# Patient Record
Sex: FEMALE | Race: ASIAN | NOT HISPANIC OR LATINO | Employment: UNEMPLOYED | URBAN - METROPOLITAN AREA
[De-identification: names, ages, dates, MRNs, and addresses within clinical notes are randomized per-mention and may not be internally consistent; named-entity substitution may affect disease eponyms.]

---

## 2023-12-07 ENCOUNTER — TRANSFERRED RECORDS (OUTPATIENT)
Dept: HEALTH INFORMATION MANAGEMENT | Facility: CLINIC | Age: 73
End: 2023-12-07

## 2024-07-24 ENCOUNTER — TELEPHONE (OUTPATIENT)
Dept: FAMILY MEDICINE | Facility: CLINIC | Age: 74
End: 2024-07-24

## 2024-07-24 NOTE — TELEPHONE ENCOUNTER
Called x2. LVM for pt. Re: Appt reminder with Dr. Da Silva tomorrow. This patient is new arrival patient and it is important that pt made it to the appt.

## 2024-07-25 ENCOUNTER — OFFICE VISIT (OUTPATIENT)
Dept: FAMILY MEDICINE | Facility: CLINIC | Age: 74
End: 2024-07-25

## 2024-07-25 ENCOUNTER — PATIENT OUTREACH (OUTPATIENT)
Dept: CARE COORDINATION | Facility: CLINIC | Age: 74
End: 2024-07-25

## 2024-07-25 VITALS
DIASTOLIC BLOOD PRESSURE: 77 MMHG | HEIGHT: 55 IN | RESPIRATION RATE: 16 BRPM | HEART RATE: 62 BPM | WEIGHT: 70.75 LBS | TEMPERATURE: 98.1 F | OXYGEN SATURATION: 97 % | SYSTOLIC BLOOD PRESSURE: 148 MMHG | BODY MASS INDEX: 16.37 KG/M2

## 2024-07-25 DIAGNOSIS — J98.01 BRONCHOSPASM: ICD-10-CM

## 2024-07-25 DIAGNOSIS — R93.89 ABNORMAL CXR: ICD-10-CM

## 2024-07-25 DIAGNOSIS — R63.4 UNEXPLAINED WEIGHT LOSS: Primary | ICD-10-CM

## 2024-07-25 DIAGNOSIS — K86.89 PANCREATIC DUCT CALCULUS: ICD-10-CM

## 2024-07-25 DIAGNOSIS — Z02.89 REFUGEE HEALTH EXAMINATION: ICD-10-CM

## 2024-07-25 LAB
ALBUMIN SERPL BCG-MCNC: 3.8 G/DL (ref 3.5–5.2)
ALP SERPL-CCNC: 99 U/L (ref 40–150)
ALT SERPL W P-5'-P-CCNC: 16 U/L (ref 0–50)
ANION GAP SERPL CALCULATED.3IONS-SCNC: 8 MMOL/L (ref 7–15)
AST SERPL W P-5'-P-CCNC: 28 U/L (ref 0–45)
BASOPHILS # BLD AUTO: 0 10E3/UL (ref 0–0.2)
BASOPHILS NFR BLD AUTO: 1 %
BILIRUB SERPL-MCNC: 0.3 MG/DL
BUN SERPL-MCNC: 18.6 MG/DL (ref 8–23)
CALCIUM SERPL-MCNC: 9.1 MG/DL (ref 8.8–10.4)
CHLORIDE SERPL-SCNC: 103 MMOL/L (ref 98–107)
CHOLEST SERPL-MCNC: 197 MG/DL
CREAT SERPL-MCNC: 0.68 MG/DL (ref 0.51–0.95)
EGFRCR SERPLBLD CKD-EPI 2021: 90 ML/MIN/1.73M2
EOSINOPHIL # BLD AUTO: 0.7 10E3/UL (ref 0–0.7)
EOSINOPHIL NFR BLD AUTO: 9 %
ERYTHROCYTE [DISTWIDTH] IN BLOOD BY AUTOMATED COUNT: 14.4 % (ref 10–15)
FASTING STATUS PATIENT QL REPORTED: ABNORMAL
FASTING STATUS PATIENT QL REPORTED: NORMAL
GLUCOSE SERPL-MCNC: 92 MG/DL (ref 70–99)
HAV AB SER QL IA: REACTIVE
HBV CORE AB SERPL QL IA: REACTIVE
HBV SURFACE AB SERPL IA-ACNC: 946 M[IU]/ML
HBV SURFACE AB SERPL IA-ACNC: REACTIVE M[IU]/ML
HBV SURFACE AG SERPL QL IA: NONREACTIVE
HCO3 SERPL-SCNC: 26 MMOL/L (ref 22–29)
HCT VFR BLD AUTO: 35.1 % (ref 35–47)
HCV AB SERPL QL IA: NONREACTIVE
HDLC SERPL-MCNC: 43 MG/DL
HGB BLD-MCNC: 11.3 G/DL (ref 11.7–15.7)
HIV 1+2 AB+HIV1 P24 AG SERPL QL IA: NONREACTIVE
IMM GRANULOCYTES # BLD: 0 10E3/UL
IMM GRANULOCYTES NFR BLD: 0 %
LDLC SERPL CALC-MCNC: 117 MG/DL
LYMPHOCYTES # BLD AUTO: 1.7 10E3/UL (ref 0.8–5.3)
LYMPHOCYTES NFR BLD AUTO: 24 %
MCH RBC QN AUTO: 28.2 PG (ref 26.5–33)
MCHC RBC AUTO-ENTMCNC: 32.2 G/DL (ref 31.5–36.5)
MCV RBC AUTO: 88 FL (ref 78–100)
MONOCYTES # BLD AUTO: 0.5 10E3/UL (ref 0–1.3)
MONOCYTES NFR BLD AUTO: 7 %
NEUTROPHILS # BLD AUTO: 4.2 10E3/UL (ref 1.6–8.3)
NEUTROPHILS NFR BLD AUTO: 58 %
NONHDLC SERPL-MCNC: 154 MG/DL
PLATELET # BLD AUTO: 228 10E3/UL (ref 150–450)
POTASSIUM SERPL-SCNC: 3.8 MMOL/L (ref 3.4–5.3)
PROT SERPL-MCNC: 7.6 G/DL (ref 6.4–8.3)
RBC # BLD AUTO: 4.01 10E6/UL (ref 3.8–5.2)
SODIUM SERPL-SCNC: 137 MMOL/L (ref 135–145)
T PALLIDUM AB SER QL: NONREACTIVE
TRIGL SERPL-MCNC: 184 MG/DL
VZV IGG SER QL IA: 2608 INDEX
VZV IGG SER QL IA: POSITIVE
WBC # BLD AUTO: 7.2 10E3/UL (ref 4–11)

## 2024-07-25 PROCEDURE — 80061 LIPID PANEL: CPT | Performed by: FAMILY MEDICINE

## 2024-07-25 PROCEDURE — 36415 COLL VENOUS BLD VENIPUNCTURE: CPT | Performed by: FAMILY MEDICINE

## 2024-07-25 PROCEDURE — 80053 COMPREHEN METABOLIC PANEL: CPT | Performed by: FAMILY MEDICINE

## 2024-07-25 PROCEDURE — 86682 HELMINTH ANTIBODY: CPT | Mod: 90 | Performed by: FAMILY MEDICINE

## 2024-07-25 PROCEDURE — 99000 SPECIMEN HANDLING OFFICE-LAB: CPT | Performed by: FAMILY MEDICINE

## 2024-07-25 PROCEDURE — 86708 HEPATITIS A ANTIBODY: CPT | Performed by: FAMILY MEDICINE

## 2024-07-25 PROCEDURE — 86481 TB AG RESPONSE T-CELL SUSP: CPT | Performed by: FAMILY MEDICINE

## 2024-07-25 PROCEDURE — 87340 HEPATITIS B SURFACE AG IA: CPT | Performed by: FAMILY MEDICINE

## 2024-07-25 PROCEDURE — 86706 HEP B SURFACE ANTIBODY: CPT | Performed by: FAMILY MEDICINE

## 2024-07-25 PROCEDURE — 86704 HEP B CORE ANTIBODY TOTAL: CPT | Performed by: FAMILY MEDICINE

## 2024-07-25 PROCEDURE — 99204 OFFICE O/P NEW MOD 45 MIN: CPT | Performed by: FAMILY MEDICINE

## 2024-07-25 PROCEDURE — 86780 TREPONEMA PALLIDUM: CPT | Performed by: FAMILY MEDICINE

## 2024-07-25 PROCEDURE — 86787 VARICELLA-ZOSTER ANTIBODY: CPT | Performed by: FAMILY MEDICINE

## 2024-07-25 PROCEDURE — 87491 CHLMYD TRACH DNA AMP PROBE: CPT | Performed by: FAMILY MEDICINE

## 2024-07-25 PROCEDURE — 85025 COMPLETE CBC W/AUTO DIFF WBC: CPT | Performed by: FAMILY MEDICINE

## 2024-07-25 PROCEDURE — 86803 HEPATITIS C AB TEST: CPT | Performed by: FAMILY MEDICINE

## 2024-07-25 PROCEDURE — 87389 HIV-1 AG W/HIV-1&-2 AB AG IA: CPT | Performed by: FAMILY MEDICINE

## 2024-07-25 NOTE — PROGRESS NOTES
Clinic Care Coordination Contact  Community Health Worker Initial Outreach    CHW Initial Information Gathering:  Referral Source: PCP  Preferred Hospital: [facility]  [phone]  Preferred Urgent Care: [facility] [place] [facility], [phone]  Current living arrangement:: I live in a private home with family  Type of residence:: Private home - stairs  Community Resources: None  Supplies Currently Used at Home: None  Equipment Currently Used at Home: none  Informal Support system:: Family  No PCP office visit in Past Year: No  Transportation means:: Family, Medical transport  CHW Additional Questions  If ED/Hospital discharge, follow-up appointment scheduled as recommended?: N/A  Medication changes made following ED/Hospital discharge?: N/A  MyChart active?: No  Patient agreeable to assistance with activating MyChart?: No    Patient accepts CC: Yes. Patient scheduled for assessment with CCC RN on 7/30/2024 at 2:00 PM. Patient noted desire to discuss medical complex and new refugee.     Patient has  through [facility]: [place]: [phone] and patient is receiving helps to apply for [employer] benefits, health insurance and discussed to apply for SSI as well.

## 2024-07-25 NOTE — PROGRESS NOTES
"ASSESMENT AND PLAN:  Diagnoses and all orders for this visit:  Unexplained weight loss  Concerning finding of unexplained weight loss along with possible mass abnormality on [place].  Reviewed differential diagnosis with the patient and her family with the help of a professional .  Patient wishes her granddaughter in law who speaks English fairly well and has a cell phone and is more established here in [place] to be the  regarding care coordination and test results etc.  Her phone number is listed as the primary contact phone number for this patient.  -     CT Chest/Abdomen/Pelvis w Contrast; Future    I am also going to get her connected with a care coordination referral because I am concerned about the patient needing a significant medical workup and will need assistance with making sure her MA is secured and that the patient and family can navigate to all of the required testing and follow-up.  Order entered.    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  I entered in a referral to gastroenterology, patient is being contacted with results, will also let her care coordination team know about this update so that ongoing care coordination can be completed.      Abnormal CXR done overseas  The overseas chest x-ray showed \"prominent bilateral hilar abnormality possible mass or adenopathy\"  -     CT Chest/Abdomen/Pelvis w Contrast; Future  Bronchospasm  Continue the as needed use of albuterol, she currently has a good supply she brought with her from [facility].  Refugee health examination  Will get her routine refugee health screening labs done today which will also help with diagnostic workup of her weight loss.  -     Quantiferon-TB Gold Plus; Future  -     Hepatitis B surface antigen; Future  -     Hepatitis B Surface Antibody; Future  -     Hepatitis B core antibody; Future  -     Hepatitis C Screen Reflex to HCV RNA Quant " and Genotype; Future  -     Comprehensive metabolic panel; Future  -     Treponema Abs w Reflex to RPR and Titer; Future  -     Varicella Zoster Virus Antibody IgG; Future  -     Strongyloides antibody IgG; Future  -     Schistosoma Antibody IgG; Future  -     Chlamydia trachomatis PCR; Future  -     Lipid panel reflex to direct LDL Fasting; Future  -     CBC with Platelets & Differential; Future  -     HIV Antigen Antibody Combo; Future  -     Hepatitis A Antibody Total; Future      Reviewed the risks and benefits of the treatment plan with the patient and/or caregiver and we discussed indications for routine and emergent follow-up.        SUBJECTIVE: 74-year-old female is a brand-new arrival refugee from the refugee [place] in [facility] and she comes in today for a 6 mediated health evaluation coordinated by the refugee resettlement team at [facility].  This is because her overseas documentation indicates that she has had a unexplained and concerning weight loss along with an abnormal chest x-ray concerning for possible mass.  Patient has not been having chronic cough or recurring fevers or hemoptysis.  No problems with abdominal pain or vomiting or diarrhea.  Her daughter and her granddaughter in law accompany her today, her granddaughter in law has been living in [place] previously and has fairly good English skills.  The patient has a history of bronchospasm and has albuterol prescribed from the physician that saw her in the refugee camp and she has been using that 2-3 times per week.  It gives her good results.  She also has been taking folic acid and other vitamin tablets from [place].    No past medical history on file.  There is no problem list on file for this patient.    No current outpatient medications on file.     History   Smoking Status    Former    Types: Cigarettes   Smokeless Tobacco    Never       OBJECTICE: BP (!) 148/77   Pulse 62   Temp 98.1  F (36.7  C) (Oral)   Resp 16   Ht 1.372 m (4'  "6\")   Wt 32.1 kg (70 lb 12 oz)   SpO2 97%   BMI 17.06 kg/m       Recent Results (from the past 24 hour(s))   CBC with platelets and differential    Collection Time: 07/25/24 10:16 AM   Result Value Ref Range    WBC Count 7.2 4.0 - 11.0 10e3/uL    RBC Count 4.01 3.80 - 5.20 10e6/uL    Hemoglobin 11.3 (L) 11.7 - 15.7 g/dL    Hematocrit 35.1 35.0 - 47.0 %    MCV 88 78 - 100 fL    MCH 28.2 26.5 - 33.0 pg    MCHC 32.2 31.5 - 36.5 g/dL    RDW 14.4 10.0 - 15.0 %    Platelet Count 228 150 - 450 10e3/uL    % Neutrophils 58 %    % Lymphocytes 24 %    % Monocytes 7 %    % Eosinophils 9 %    % Basophils 1 %    % Immature Granulocytes 0 %    Absolute Neutrophils 4.2 1.6 - 8.3 10e3/uL    Absolute Lymphocytes 1.7 0.8 - 5.3 10e3/uL    Absolute Monocytes 0.5 0.0 - 1.3 10e3/uL    Absolute Eosinophils 0.7 0.0 - 0.7 10e3/uL    Absolute Basophils 0.0 0.0 - 0.2 10e3/uL    Absolute Immature Granulocytes 0.0 <=0.4 10e3/uL        GEN-alert, very hard of hearing, in no acute distress   HEENT-mucous membranes are moist, neck is supple without any palpable mass or lymphadenopathy   CV-regular rate and rhythm with no murmur   RESP-somewhat distant breath sounds, no wheezing or crackles, no respiratory distress, O2 sats 97% on room air   ABDOMINAL-soft, nontender, no palpable mass   EXTREM-no pitting edema of the ankles     Signed Electronically by: Dickson Da Silva MD    "

## 2024-07-26 LAB
C TRACH DNA SPEC QL NAA+PROBE: NEGATIVE
GAMMA INTERFERON BACKGROUND BLD IA-ACNC: 0.07 IU/ML
M TB IFN-G BLD-IMP: NEGATIVE
M TB IFN-G CD4+ BCKGRND COR BLD-ACNC: 2.63 IU/ML
MITOGEN IGNF BCKGRD COR BLD-ACNC: -0.01 IU/ML
MITOGEN IGNF BCKGRD COR BLD-ACNC: 0 IU/ML
QUANTIFERON MITOGEN: 2.7 IU/ML
QUANTIFERON NIL TUBE: 0.07 IU/ML
QUANTIFERON TB1 TUBE: 0.06 IU/ML
QUANTIFERON TB2 TUBE: 0.07

## 2024-07-27 LAB
SCHISTOSOMA IGG SER IA-ACNC: 10 U
STRONGYLOIDES IGG SER IA-ACNC: 0.3 IV

## 2024-07-30 ENCOUNTER — PATIENT OUTREACH (OUTPATIENT)
Dept: NURSING | Facility: CLINIC | Age: 74
End: 2024-07-30

## 2024-08-02 ENCOUNTER — HOSPITAL ENCOUNTER (OUTPATIENT)
Dept: CT IMAGING | Facility: HOSPITAL | Age: 74
Discharge: HOME | End: 2024-08-02
Attending: FAMILY MEDICINE | Admitting: FAMILY MEDICINE

## 2024-08-02 DIAGNOSIS — R93.89 ABNORMAL CXR: ICD-10-CM

## 2024-08-02 DIAGNOSIS — R63.4 UNEXPLAINED WEIGHT LOSS: ICD-10-CM

## 2024-08-02 PROCEDURE — 250N000009 HC RX 250: Performed by: FAMILY MEDICINE

## 2024-08-02 PROCEDURE — 250N000011 HC RX IP 250 OP 636: Performed by: FAMILY MEDICINE

## 2024-08-02 PROCEDURE — 71260 CT THORAX DX C+: CPT

## 2024-08-02 RX ORDER — IOPAMIDOL 755 MG/ML
35 INJECTION, SOLUTION INTRAVASCULAR ONCE
Status: COMPLETED | OUTPATIENT
Start: 2024-08-02 | End: 2024-08-02

## 2024-08-02 RX ADMIN — IOPAMIDOL 35 ML: 755 INJECTION, SOLUTION INTRAVENOUS at 14:55

## 2024-08-02 RX ADMIN — SODIUM CHLORIDE 90 ML: 9 INJECTION, SOLUTION INTRAVENOUS at 14:56

## 2024-08-06 ENCOUNTER — PATIENT OUTREACH (OUTPATIENT)
Dept: NURSING | Facility: CLINIC | Age: 74
End: 2024-08-06

## 2024-08-06 ASSESSMENT — LIFESTYLE VARIABLES
AUDIT-C TOTAL SCORE: 0
SKIP TO QUESTIONS 9-10: 1

## 2024-08-06 ASSESSMENT — ACTIVITIES OF DAILY LIVING (ADL)
DEPENDENT_IADLS:: CLEANING;COOKING;LAUNDRY;SHOPPING;MEAL PREPARATION;MEDICATION MANAGEMENT;MONEY MANAGEMENT;TRANSPORTATION;INCONTINENCE

## 2024-08-06 NOTE — PROGRESS NOTES
Clinic Care Coordination Contact  Clinic Care Coordination Contact  OUTREACH    Referral Information:  Referral Source: PCP    Primary Diagnosis: GI Disorders    Chief Complaint   Patient presents with    Clinic Care Coordination - Follow-up     Clinic Utilization  Difficulty keeping appointments:: No  Compliance Concerns: No  No-Show Concerns: No  No PCP office visit in Past Year: No  Utilization      No Show Count (past year)  0             ED Visits  0             Hospital Admissions  0                    Current as of: 8/6/2024  3:42 PM            Clinical Concerns:  CCRN completed initial assessment with patient and grand daughter in law Logan Pineda via phone today. Logan Pineda - grand daughter in law. No C2C communicate on file. Patient gave verbal permission to speak to Logan Carreonchanell today. CCRN emailed Logan Pineda a copy of C2C to fill out and sign by patient. Logan will either email the form back or drop it off [facility]. Patient was referred to care coordination by Dr Da Silva to assist patient with coordinate cares, community resources, and navigate complex health care system due to [place]. Patient came to [facility] refugee [place] to [place] on 7/18/2024 with daughter and 4 grand kids. CCRN relayed Dr Perez's message below and patient agreed to schedule appointment with GI.     Prefers appt before 12pm or after 3:30pm     Dickson Da Silva MD  [facility] -please inform the patient that her CT scan shows a pancreatic duct stone and some associated changes in her pancreas that likely explain her weight loss.  The good news is that there is no signs of any type of cancer on her CT scan.  She will need to see gastroenterology for consultation for pancreatic duct stone-order entered, please help coordinate.  Thank you.    Gastroenterology - pancreatic duct stone and unexplained weight loss  CCRN called GI clinic today to assist patient schedule appointment but was  told clinic RN still need to review the referral and it can take up to 12 days. CCRN plans to follow up on this next week. Educated patient and grand daughter on what is pancreatic duct stone and the important of follow up appointment with GI provider. Patient verbalized understanding. New goal created.     Establish care with a primary provider  CCRN assisted patient schedule to establish care with Dr Farris on 10/21/2024 at 8:10 am.     Refugee screening   Patient, daughter and 4 grand children are scheduled for refugee screening which is required as new refugee with Dr Hooks on 8/21/2024 at 11:40am.     Dental exam  Will assist with patient dental appointment once insurance is active.     [facility] location  [address] [place] [address]  Phone: [phone]  Fax: [phone]    Eye exam  Will assist patient with eye appointment once insurance is active.     [facility]   [address] [place] [address]    [phone]    [place] ID   Grand daughter will take patient to apply for [place] ID    Social security card   Per grand daughter, patient already received SSN card.     [facility] service   Patient and grand daughter agreed to [facility] service with [facility] resources, apply for SSI and PCA. Gave verbal permission to refer patient to [place] once insurance active.     Pain  Pain (GOAL):: No  Health Maintenance Reviewed: Due/Overdue   Clinical Pathway: None    Medication Management:  Medication review status: Medications reviewed and no changes reported per patient.           Functional Status:  Dependent ADLs:: Bathing, Dressing, Grooming  Dependent IADLs:: Cleaning, Cooking, Laundry, Shopping, Meal Preparation, Medication Management, Money Management, Transportation, Incontinence  Bed or wheelchair confined:: No  Mobility Status: Independent  Fallen 2 or more times in the past year?: No  Any fall with injury in the past year?: No    Living Situation:  Current living  arrangement:: I live in a private home with family  Type of residence:: Private home - stairs    Lifestyle & Psychosocial Needs:    Social Determinants of Health     Food Insecurity: Low Risk  (8/6/2024)    Food Insecurity     Within the past 12 months, did you worry that your food would run out before you got money to buy more?: No     Within the past 12 months, did the food you bought just not last and you didn t have money to get more?: No   Depression: Not at risk (7/25/2024)    PHQ-2     PHQ-2 Score: 0   Housing Stability: Low Risk  (8/6/2024)    Housing Stability     Do you have housing? : Yes     Are you worried about losing your housing?: No   Tobacco Use: Medium Risk (7/25/2024)    Patient History     Smoking Tobacco Use: Former     Smokeless Tobacco Use: Never     Passive Exposure: Never   Financial Resource Strain: Low Risk  (8/6/2024)    Financial Resource Strain     Within the past 12 months, have you or your family members you live with been unable to get utilities (heat, electricity) when it was really needed?: No   Alcohol Use: Not At Risk (8/6/2024)    AUDIT-C     Frequency of Alcohol Consumption: Never     Average Number of Drinks: Patient does not drink     Frequency of Binge Drinking: Never   Transportation Needs: Low Risk  (8/6/2024)    Transportation Needs     Within the past 12 months, has lack of transportation kept you from medical appointments, getting your medicines, non-medical meetings or appointments, work, or from getting things that you need?: No   Physical Activity: Inactive (8/6/2024)    Exercise Vital Sign     Days of Exercise per Week: 0 days     Minutes of Exercise per Session: 0 min   Interpersonal Safety: Low Risk  (8/6/2024)    Interpersonal Safety     Do you feel physically and emotionally safe where you currently live?: Yes     Within the past 12 months, have you been hit, slapped, kicked or otherwise physically hurt by someone?: No     Within the past 12 months, have you  been humiliated or emotionally abused in other ways by your partner or ex-partner?: No   Stress: Stress Concern Present (8/6/2024)    [employer] - Occupational Stress Questionnaire     Feeling of Stress : To some extent   Social Connections: Unknown (8/6/2024)    Social Connection and Isolation Panel [NHANES]     Frequency of Communication with Friends and Family: More than three times a week     Frequency of Social Gatherings with Friends and Family: More than three times a week     Attends Mu-ism Services: More than 4 times per year     Active Member of Clubs or Organizations: Yes     Attends Club or Organization Meetings: Never     Marital Status: Not on file   Health Literacy: Inadequate Health Literacy (8/6/2024)     Health Literacy     Frequency of need for help with medical instructions: Always     Diet:: Regular  Inadequate nutrition (GOAL):: No  Tube Feeding: No  Inadequate activity/exercise (GOAL):: Yes  Significant changes in sleep pattern (GOAL): No  Transportation means:: Regular car, Family     Mu-ism or spiritual beliefs that impact treatment:: No  Mental health DX:: No  Mental health management concern (GOAL):: No  Chemical Dependency Status: No Current Concerns  Informal Support system:: Family, Children, [place] based      Resources and Interventions:  Current Resources:      Community Resources: None  Supplies Currently Used at Home: None  Equipment Currently Used at Home: none     Advance Care Plan/Directive  Advanced Care Plans/Directives on file:: No  Discussed with patient/caregiver:: Declined Further Information    Referrals Placed: None     Care Plan:  Care Plan: Gastroenterology       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       Start Date: 8/6/2024 Expected End Date: 12/31/2024    This Visit's Progress: 10%    Note:     Barriers:  language barrier, low literacy, noncompliance, and lack of knowledge how to navigate complex health care system  Strengths: motivated to attend appt  Patient expressed understanding of goal: Yes    Action steps to achieve this goal:  1. I will answer my phone when I am contacted to schedule my appointment.  2. I will attend my gastroenterology clinic appointment as scheduled  TBD  3. I will schedule a follow up appointment with my provider if it is recommended to do so while I am at the clinic.  4. I will follow up with CCC regarding this goal at each outreach until it is completed.                               Care Plan: Establish care with a PCP       Problem: establish care       Goal: Patient will establish care with a PCP in the next 5 months.       Start Date: 8/6/2024 Expected End Date: 12/31/2024    This Visit's Progress: 10%                            Outreach Frequency: 6 weeks, more frequently as needed  Future Appointments                In 2 weeks Megan Hooks MD [facility]    In 2 months Maia Walton MD [facility]            Plan: CCRN plans to follow up with GI clinic 1-2 weeks to follow up on appt status.

## 2024-08-09 ENCOUNTER — TELEPHONE (OUTPATIENT)
Dept: GASTROENTEROLOGY | Facility: CLINIC | Age: 74
End: 2024-08-09

## 2024-08-09 NOTE — TELEPHONE ENCOUNTER
Advanced [facility]oscopy     Referring provider: Dickson Da Silva MD [address] [place] [address] [address] [place] [address] Phone: [phone] Fax: [phone]    Referred to: [facility]     Provider Requested: NA     Referral Received: 8/5/24     Records received: EPIC     Images received: PACS    Insurance Coverage: Requested 8/9/24. 8/12/24: Patient currently uninsured. Will reconnect after the first of next month to reevaluate.     Evaluation for: R63.4 (ICD-10-CM) - Unexplained weight loss K86.89 (ICD-10-CM) - Pancreatic duct calculus     Clinical History (per RN review):   EXAM: CT CHEST/ABDOMEN/PELVIS W CONTRAST  LOCATION: [facility]  DATE: 8/2/2024     INDICATION:  Unexplained weight loss, Abnormal CXR    PANCREAS: 8 mm obstructing stone in the mid main pancreatic duct with marked ductal dilation and parenchymal atrophy and calcification upstream.                                                                  IMPRESSION:  1.  8 mm obstructing stone in the main pancreatic duct with significant atrophy of the pancreas upstream. Consider malabsorption as a consequence of pancreatic insufficiency as the etiology of weight loss.  2.  No evidence of cancer     Liver Function Studies -   Recent Labs   Lab Test 07/25/24  1016   PROTTOTAL 7.6   ALBUMIN 3.8   BILITOTAL 0.3   ALKPHOS 99   AST 28   ALT 16     MD review date: Dr. Rm 9/5/24  MD Decision for clinic consultation/Orders:         If pt is symptomatic, she  will be appropriate for the study as well. Please schedule ERCP with me.        Referral updates/Patient contacted: Awaiting insurance update.   8/12/24: Patient currently uninsured. Will reconnect after the first of next month to reevaluate.   9/3/24: Per Clinic coordinator team:  spoke with Granddaughter- and she stated that she will have to get the information from her  and will give the information to the billing office--9/3   10/10/24: Attempt to  "reach. [facility] with clinic contact information.   Patient's grandson returned call to clinic. Spoke with assistance from  services. He states that patient is \"Feeling fine\". No complaints of pain. No fevers. Eating and drinking well. Grandson believes she has now been maintaining her weight, though they have not weighed her recently. He states they have a  who is assisting with insurance coverage but it is not yet active. Discussed Dr. Rm's recommendation for outpatient care. Recommended they contact our clinic when insurance is active and we can pursue scheduling. Advised that if patient is experiencing pain, fevers or is unable to eat or drink, she should be brought to the [facility] Emergency department for care. Confirmed home address to send letter with clinic phone number and ED address.     Darcy Leonard RN Care Coordinator    "

## 2024-08-19 DIAGNOSIS — K86.89 PANCREATIC INSUFFICIENCY: ICD-10-CM

## 2024-08-19 DIAGNOSIS — K86.89 PANCREATIC DUCT CALCULUS: Primary | ICD-10-CM

## 2024-08-19 PROBLEM — D63.8 ANEMIA IN OTHER CHRONIC DISEASES CLASSIFIED ELSEWHERE: Status: ACTIVE | Noted: 2024-08-19

## 2024-08-19 PROBLEM — I51.7 CARDIOMEGALY: Status: ACTIVE | Noted: 2024-08-19

## 2024-08-19 PROBLEM — H25.013 CORTICAL AGE-RELATED CATARACT OF BOTH EYES: Status: ACTIVE | Noted: 2024-08-19

## 2024-08-19 PROBLEM — A15.0 PULMONARY TB: Status: ACTIVE | Noted: 2024-08-19

## 2024-08-19 PROBLEM — I10 ESSENTIAL HYPERTENSION: Status: ACTIVE | Noted: 2024-08-19

## 2024-08-19 PROBLEM — F17.200 SMOKER: Status: ACTIVE | Noted: 2024-08-19

## 2024-08-19 PROBLEM — H90.6 MIXED CONDUCTIVE AND SENSORINEURAL HEARING LOSS OF BOTH EARS: Status: ACTIVE | Noted: 2024-08-19

## 2024-08-21 ENCOUNTER — OFFICE VISIT (OUTPATIENT)
Dept: FAMILY MEDICINE | Facility: CLINIC | Age: 74
End: 2024-08-21

## 2024-08-21 VITALS
BODY MASS INDEX: 16.91 KG/M2 | HEART RATE: 80 BPM | WEIGHT: 73.08 LBS | SYSTOLIC BLOOD PRESSURE: 103 MMHG | HEIGHT: 55 IN | DIASTOLIC BLOOD PRESSURE: 66 MMHG | OXYGEN SATURATION: 97 % | TEMPERATURE: 98.5 F | RESPIRATION RATE: 18 BRPM

## 2024-08-21 DIAGNOSIS — K86.89 PANCREATIC DUCT CALCULUS: ICD-10-CM

## 2024-08-21 DIAGNOSIS — E55.9 VITAMIN D DEFICIENCY: ICD-10-CM

## 2024-08-21 DIAGNOSIS — K86.89 PANCREATIC INSUFFICIENCY: ICD-10-CM

## 2024-08-21 DIAGNOSIS — Z12.11 SCREEN FOR COLON CANCER: ICD-10-CM

## 2024-08-21 DIAGNOSIS — H25.013 CORTICAL AGE-RELATED CATARACT OF BOTH EYES: ICD-10-CM

## 2024-08-21 DIAGNOSIS — R63.6 UNDERWEIGHT: ICD-10-CM

## 2024-08-21 DIAGNOSIS — I51.7 CARDIOMEGALY: ICD-10-CM

## 2024-08-21 DIAGNOSIS — F17.200 SMOKER: ICD-10-CM

## 2024-08-21 DIAGNOSIS — H90.6 MIXED CONDUCTIVE AND SENSORINEURAL HEARING LOSS OF BOTH EARS: ICD-10-CM

## 2024-08-21 DIAGNOSIS — D63.8 ANEMIA IN OTHER CHRONIC DISEASES CLASSIFIED ELSEWHERE: ICD-10-CM

## 2024-08-21 DIAGNOSIS — Z78.9 MEDICALLY COMPLEX PATIENT: ICD-10-CM

## 2024-08-21 DIAGNOSIS — I10 ESSENTIAL HYPERTENSION: ICD-10-CM

## 2024-08-21 DIAGNOSIS — Z02.89 REFUGEE HEALTH EXAMINATION: Primary | ICD-10-CM

## 2024-08-21 PROCEDURE — 99397 PER PM REEVAL EST PAT 65+ YR: CPT | Mod: 25 | Performed by: FAMILY MEDICINE

## 2024-08-21 PROCEDURE — 84590 ASSAY OF VITAMIN A: CPT | Mod: 90 | Performed by: FAMILY MEDICINE

## 2024-08-21 PROCEDURE — 99213 OFFICE O/P EST LOW 20 MIN: CPT | Mod: 25 | Performed by: FAMILY MEDICINE

## 2024-08-21 PROCEDURE — 82150 ASSAY OF AMYLASE: CPT | Performed by: FAMILY MEDICINE

## 2024-08-21 PROCEDURE — 36415 COLL VENOUS BLD VENIPUNCTURE: CPT | Performed by: FAMILY MEDICINE

## 2024-08-21 PROCEDURE — 82607 VITAMIN B-12: CPT | Performed by: FAMILY MEDICINE

## 2024-08-21 PROCEDURE — 90471 IMMUNIZATION ADMIN: CPT | Performed by: FAMILY MEDICINE

## 2024-08-21 PROCEDURE — 84630 ASSAY OF ZINC: CPT | Mod: 90 | Performed by: FAMILY MEDICINE

## 2024-08-21 PROCEDURE — 82306 VITAMIN D 25 HYDROXY: CPT | Performed by: FAMILY MEDICINE

## 2024-08-21 PROCEDURE — 90715 TDAP VACCINE 7 YRS/> IM: CPT | Performed by: FAMILY MEDICINE

## 2024-08-21 PROCEDURE — 99000 SPECIMEN HANDLING OFFICE-LAB: CPT | Performed by: FAMILY MEDICINE

## 2024-08-21 PROCEDURE — 83690 ASSAY OF LIPASE: CPT | Performed by: FAMILY MEDICINE

## 2024-08-21 NOTE — PROGRESS NOTES
Assessment & Plan     Refugee health examination  Chart filled in with known history. This is a medically complex patient  - REVIEW OF HEALTH MAINTENANCE PROTOCOL ORDERS  - TDAP 10-64Y (ADACEL,BOOSTRIX)    Screen for colon cancer  declined    Pancreatic duct calculus  Found as the reason her appetite is so low. She has been referred for the next step in care.  - Vitamin B12  - Zinc  - Vitamin A  - Vitamin D Deficiency  - Amylase  - Lipase    Pancreatic insufficiency  Caused by the stone  - Vitamin B12  - Zinc  - Vitamin A  - Vitamin D Deficiency  - Amylase  - Lipase    Underweight  Result of not eating    Cardiomegaly  Noted on CXR    Cortical age-related cataract of both eyes  Severe. Hope to refer for removal    Anemia in other chronic diseases classified elsewhere  Noted, this should improve once she can eat more. I asked the family to boil her rice with meat    Essential hypertension  Following. BP not high today    Smoker  Noted, but she says she quit upon arrival to the [place].    Mixed conductive and sensorineural hearing loss of both ears  Hearing loss is profound - will refer for assessment    On the day of service, I spent 60 minutes with the patient, preparing for the visit with chart review, and charting.                  In person  Subjective   Marciano is a 74 year old, presenting for the following health issues:  refugee screening (Pt wants to talk about the imaging result )      8/21/2024    11:42 AM   Additional Questions   Roomed by vinnie mendoza   Accompanied by granddaughter-in-law and grandson          8/21/2024    11:42 AM   Patient Reported Additional Medications   Patient reports taking the following new medications inhaler spacer     HPI   Never hospitalized  No surgery    No to mammogram    Can barely eat. Understands there is a problem in the digestive system causing her not to be able to eat.        Objective    /66   Pulse 80   Temp 98.5  F (36.9  C) (Oral)   Resp 18   Ht 1.376 m (4'  "6.17\")   Wt 33.1 kg (73 lb 1.3 oz)   SpO2 97%   BMI 17.51 kg/m    Body mass index is 17.51 kg/m .  Physical Exam   GENERAL: alert, no distress, frail, elderly, and fatigued  EYES: cataracts visible in pupils, EOMI  NECK: no adenopathy, no asymmetry, masses, or scars  RESP: lungs clear to auscultation - no rales, rhonchi or wheezes  CV: regular rates and rhythm, normal S1 S2, and no peripheral edema  ABDOMEN: soft, nontender, bowel sounds normal, and mass none  MS: muscle wasting slight, generally, no edema  SKIN: no suspicious lesions or rashes and xerosis - generalized  NEURO: Normal strength and tone, sensory exam grossly normal, and abnormal mental status - she is alert but obviously does not hear well, even when she can hear (a family member speaks loudly and simply), she responds with something that does not always make sense  PSYCH: inattentive, affect flat, judgement and insight impaired, and appearance well groomed    Office Visit on 07/25/2024   Component Date Value Ref Range Status    Hepatitis B Surface Antigen 07/25/2024 Nonreactive  Nonreactive Final    Hepatitis B Surface Antibody 07/25/2024 Reactive   Final    A reactive result indicates recovery from acute or chronic hepatitis B virus (HBV) infection or acquired immunity from HBV vaccination. This assay does not differentiate between a vaccine-induced immune response and an immune response induced by infection with HBV. A positive total antihepatitis B core result would indicate that the hepatitis B surface antibody response is due to past HBV infection.    Hepatitis B Surface Antibody Instr* 07/25/2024 946.00  <8.5 m[IU]/mL Final    Hepatitis B Core Antibody Total 07/25/2024 Reactive (A)  Nonreactive Final    A reactive result indicates acute, chronic or past/resolved hepatitis B infection.    Hepatitis C Antibody 07/25/2024 Nonreactive  Nonreactive Final    A nonreactive screening test result does not exclude the possibility of exposure to or " infection with HCV. Nonreactive screening test results in individuals with prior exposure to HCV may be due to antibody levels below the limit of detection of this assay or lack of reactivity to the HCV antigens used in this assay. Patients with recent HCV infections (<3 months from time of exposure) may have false-negative HCV antibody results due to the time needed for seroconversion (average of 8 to 9 weeks).    Sodium 07/25/2024 137  135 - 145 mmol/L Final    Potassium 07/25/2024 3.8  3.4 - 5.3 mmol/L Final    Carbon Dioxide (CO2) 07/25/2024 26  22 - 29 mmol/L Final    Anion Gap 07/25/2024 8  7 - 15 mmol/L Final    Urea Nitrogen 07/25/2024 18.6  8.0 - 23.0 mg/dL Final    Creatinine 07/25/2024 0.68  0.51 - 0.95 mg/dL Final    GFR Estimate 07/25/2024 90  >60 mL/min/1.73m2 Final    eGFR calculated using 2021 CKD-EPI equation.    Calcium 07/25/2024 9.1  8.8 - 10.4 mg/dL Final    Reference intervals for this test were updated on 7/16/2024 to reflect our healthy population more accurately. There may be differences in the flagging of prior results with similar values performed with this method. Those prior results can be interpreted in the context of the updated reference intervals.    Chloride 07/25/2024 103  98 - 107 mmol/L Final    Glucose 07/25/2024 92  70 - 99 mg/dL Final    Alkaline Phosphatase 07/25/2024 99  40 - 150 U/L Final    AST 07/25/2024 28  0 - 45 U/L Final    ALT 07/25/2024 16  0 - 50 U/L Final    Protein Total 07/25/2024 7.6  6.4 - 8.3 g/dL Final    Albumin 07/25/2024 3.8  3.5 - 5.2 g/dL Final    Bilirubin Total 07/25/2024 0.3  <=1.2 mg/dL Final    Patient Fasting > 8hrs? 07/25/2024 Unknown   Final    Treponema Antibody Total 07/25/2024 Nonreactive  Nonreactive Final    VZV Winsome IgG Instrument Value 07/25/2024 2,608.0  <135.0 Index Final    Varicella Zoster Antibody IgG 07/25/2024 Positive   Final    Suggests previous exposure or immunization and probable immunity.    Strongyloides Winsome IgG 07/25/2024  0.3  <=0.9 IV Final    Comment: INTERPRETIVE INFORMATION: Strongyloides Ab, IgG by HESHAM      0.9 IV or less....... Negative - No significant                          level of Strongyloides IgG                          antibody detected.       1.0 IV................Equivocal - The Strongyloides IgG                           antibody result is borderline and                           therefore inconclusive. Recommend                           retesting the patient in 2-4 weeks,                          if clinically indicated.      1.1 IV or greater ... Positive - IgG antibodies to                          Strongyloides detected, which                          may suggest current or past                          infection.    False-positive results may occur with prior exposure to   other helminth infections. Testing low-prevalence   populations may also result in false-positive results.  Performed By: [facility]  [address] [place] [address]  : Chriss Mojica MD, PhD  CLIA                            Number: [id number]    Schistosoma Antibody IgG 07/25/2024 10 (H)  <=8 U Final      Equivocal - Recommend repeat testing in 2-4 weeks with   fresh sample.  Performed By: [facility]  [address] [place] [address]  : Chriss Mojica MD, PhD  CLIA Number: [id number]    Chlamydia trachomatis 07/25/2024 Negative  Negative Final    A negative result by transcription mediated amplification does not preclude the presence of C. trachomatis infection because results are dependent on proper and adequate collection, absence of inhibitors and sufficient rRNA to be detected.    Cholesterol 07/25/2024 197  <200 mg/dL Final    Triglycerides 07/25/2024 184 (H)  <150 mg/dL Final    Direct Measure HDL 07/25/2024 43 (L)  >=50 mg/dL Final    LDL Cholesterol Calculated 07/25/2024 117 (H)  <=100 mg/dL Final    Non HDL Cholesterol 07/25/2024 154 (H)  <130 mg/dL  Final    Patient Fasting > 8hrs? 07/25/2024 Unknown   Final    HIV Antigen Antibody Combo 07/25/2024 Nonreactive  Nonreactive Final    Negative HIV-1 p24 antigen and HIV-1/2 antibody screening test results usually indicate the absence of HIV-1 and HIV-2 infection. However, such negative results do not rule-out acute HIV infection.  If acute HIV-1 or HIV-2 infection is suspected, detection of HIV-1 or HIV-2 RNA  is recommended.     Hepatitis A Antibody Total 07/25/2024 Reactive   Final    This assay detects the presence of anti-hepatitis A virus (anti-HAV) total (both IgG and IgM combined). If clinically indicated, specific testing for anti-HAV IgM (HAVM / Hepatitis A IgM Antibody, Serum) is necessary to confirm the presence of acute or recent hepatitis A. Please see interpretation guide below.    Quantiferon Nil Tube 07/25/2024 0.07  IU/mL Final    Quantiferon TB1 Tube 07/25/2024 0.06  IU/mL Final    Quantiferon TB2 Tube 07/25/2024 0.07   Final    Quantiferon Mitogen 07/25/2024 2.70  IU/mL Final    WBC Count 07/25/2024 7.2  4.0 - 11.0 10e3/uL Final    RBC Count 07/25/2024 4.01  3.80 - 5.20 10e6/uL Final    Hemoglobin 07/25/2024 11.3 (L)  11.7 - 15.7 g/dL Final    Hematocrit 07/25/2024 35.1  35.0 - 47.0 % Final    MCV 07/25/2024 88  78 - 100 fL Final    MCH 07/25/2024 28.2  26.5 - 33.0 pg Final    MCHC 07/25/2024 32.2  31.5 - 36.5 g/dL Final    RDW 07/25/2024 14.4  10.0 - 15.0 % Final    Platelet Count 07/25/2024 228  150 - 450 10e3/uL Final    % Neutrophils 07/25/2024 58  % Final    % Lymphocytes 07/25/2024 24  % Final    % Monocytes 07/25/2024 7  % Final    % Eosinophils 07/25/2024 9  % Final    % Basophils 07/25/2024 1  % Final    % Immature Granulocytes 07/25/2024 0  % Final    Absolute Neutrophils 07/25/2024 4.2  1.6 - 8.3 10e3/uL Final    Absolute Lymphocytes 07/25/2024 1.7  0.8 - 5.3 10e3/uL Final    Absolute Monocytes 07/25/2024 0.5  0.0 - 1.3 10e3/uL Final    Absolute Eosinophils 07/25/2024 0.7  0.0 - 0.7  10e3/uL Final    Absolute Basophils 07/25/2024 0.0  0.0 - 0.2 10e3/uL Final    Absolute Immature Granulocytes 07/25/2024 0.0  <=0.4 10e3/uL Final    Quantiferon-TB Gold Plus 07/25/2024 Negative  Negative Final    No interferon gamma response to M.tuberculosis antigens was detected. Infection with M.tuberculosis is unlikely, however a single negative result does not exclude infection. In patients at high risk for infection, a second test should be considered in accordance with the 2017 ATS/IDSA/CDC Clinical Pract  ice Guidelines for Diagnosis of Tuberculosis in Adults and Children     TB1 Ag minus Nil Value 07/25/2024 -0.01  IU/mL Final    TB2 Ag minus Nil Value 07/25/2024 0.00  IU/mL Final    Mitogen minus Nil Result 07/25/2024 2.63  IU/mL Final    Nil Result 07/25/2024 0.07  IU/mL Final     Results for orders placed or performed in visit on 08/21/24   Vitamin B12     Status: Abnormal   Result Value Ref Range    Vitamin B12 2,219 (H) 232 - 1,245 pg/mL   Zinc     Status: None   Result Value Ref Range    Zinc, Serum/Plasma 61.3 60.0 - 120.0 ug/dL   Vitamin D Deficiency     Status: Abnormal   Result Value Ref Range    Vitamin D, Total (25-Hydroxy) 16 (L) 20 - 50 ng/mL    Narrative    Season, race, dietary intake, and treatment affect the concentration of 25-hydroxy-Vitamin D. Values may decrease during winter months and increase during summer months.    Vitamin D determination is routinely performed by an immunoassay specific for 25 hydroxyvitamin D3.  If an individual is on vitamin D2(ergocalciferol) supplementation, please specify 25 OH vitamin D2 and D3 level determination by LCMSMS test VITD23.     Amylase     Status: Normal   Result Value Ref Range    Amylase 65 28 - 100 U/L   Lipase     Status: Normal   Result Value Ref Range    Lipase 33 13 - 60 U/L           Signed Electronically by: Megan Hooks MD

## 2024-08-22 LAB
AMYLASE SERPL-CCNC: 65 U/L (ref 28–100)
LIPASE SERPL-CCNC: 33 U/L (ref 13–60)
VIT B12 SERPL-MCNC: 2219 PG/ML (ref 232–1245)
VIT D+METAB SERPL-MCNC: 16 NG/ML (ref 20–50)

## 2024-08-23 ENCOUNTER — TELEPHONE (OUTPATIENT)
Dept: FAMILY MEDICINE | Facility: CLINIC | Age: 74
End: 2024-08-23

## 2024-08-23 PROBLEM — Z78.9 MEDICALLY COMPLEX PATIENT: Status: ACTIVE | Noted: 2024-08-23

## 2024-08-23 PROBLEM — E55.9 VITAMIN D DEFICIENCY: Status: ACTIVE | Noted: 2024-08-23

## 2024-08-23 LAB — ZINC SERPL-MCNC: 61.3 UG/DL

## 2024-08-23 RX ORDER — CHOLECALCIFEROL (VITAMIN D3) 50 MCG
1 TABLET ORAL DAILY
Qty: 90 TABLET | Refills: 4 | Status: SHIPPED | OUTPATIENT
Start: 2024-08-23

## 2024-08-23 NOTE — TELEPHONE ENCOUNTER
Forms/Letter Request    Type of form/letter: DMMISHA/Handicap Parking      Do we have the form/letter: Yes    Who is the form from? Standard form (if other please explain)    Where did/will the form come from? Patient or family brought in       When is form/letter needed by: ASAP     How would you like the form/letter returned: Mail  Is this the correct address?: No -[facility]   Veh[facility] Services Division   [address] [place] [id number]     Patient Notified form requests are processed in 5-7 business days:Yes

## 2024-08-24 LAB
ANNOTATION COMMENT IMP: NORMAL
RETINYL PALMITATE SERPL-MCNC: 0.04 MG/L
VIT A SERPL-MCNC: 0.71 MG/L

## 2024-08-26 ENCOUNTER — TELEPHONE (OUTPATIENT)
Dept: FAMILY MEDICINE | Facility: CLINIC | Age: 74
End: 2024-08-26

## 2024-08-26 NOTE — TELEPHONE ENCOUNTER
----- Message from Megan Hooks sent at 8/25/2024  2:19 PM CDT -----  Team - please call patient with results. Let her/her family know her only vitamin deficiency found was for vitamin D. Vitamin A and zinc and B12 were all fine/normal.

## 2024-08-26 NOTE — TELEPHONE ENCOUNTER
"Writer called patient with the help of a \"Lula\"  regarding clinician's message below. Clinician's message relayed to patient's grandchild, Logan Pineda (CTC on file).    Grandchild verbalizes understanding, agrees with plan and has no further questions.    SHANTE YoN, RN   [facility]    "

## 2024-09-03 ENCOUNTER — PATIENT OUTREACH (OUTPATIENT)
Dept: NURSING | Facility: CLINIC | Age: 74
End: 2024-09-03

## 2024-09-03 ENCOUNTER — TELEPHONE (OUTPATIENT)
Dept: GASTROENTEROLOGY | Facility: CLINIC | Age: 74
End: 2024-09-03

## 2024-09-03 NOTE — PROGRESS NOTES
Clinic Care Coordination Contact  Follow Up Progress Note      Assessment: Patient's health [place] still [place] ROBSON Magana. Unable to assist patient scheduled GI appointment. Plan to assist patient schedule GI appt, dental appt, eye appt, PCA assessment, SSI, armhs service once insurance is active.     GI consult  GUIDO spoke with Javan today to follow up on appointment status. Per Javan, it's almost a month now and the referral wasn't triage out yet. Javan will reach out to his supervisor to follow up on this.     Plan: CCRN/CHW plans to assist patient schedule appts once health insurance is active.

## 2024-10-07 ENCOUNTER — PATIENT OUTREACH (OUTPATIENT)
Dept: CARE COORDINATION | Facility: CLINIC | Age: 74
End: 2024-10-07

## 2024-10-07 NOTE — PROGRESS NOTES
Attempted to reach patient and left VM. Current preventative visit is overdue. left scheduling number for patient to call. [phone]

## 2024-10-21 ENCOUNTER — OFFICE VISIT (OUTPATIENT)
Dept: FAMILY MEDICINE | Facility: CLINIC | Age: 74
End: 2024-10-21

## 2024-10-21 VITALS
WEIGHT: 78 LBS | HEIGHT: 55 IN | RESPIRATION RATE: 20 BRPM | OXYGEN SATURATION: 96 % | SYSTOLIC BLOOD PRESSURE: 128 MMHG | DIASTOLIC BLOOD PRESSURE: 81 MMHG | HEART RATE: 64 BPM | TEMPERATURE: 98.5 F | BODY MASS INDEX: 18.05 KG/M2

## 2024-10-21 DIAGNOSIS — Z23 NEED FOR VACCINATION: ICD-10-CM

## 2024-10-21 DIAGNOSIS — K86.89 PANCREATIC DUCT CALCULUS: ICD-10-CM

## 2024-10-21 DIAGNOSIS — K86.89 PANCREATIC INSUFFICIENCY: Primary | ICD-10-CM

## 2024-10-21 DIAGNOSIS — Z59.71 INSURANCE COVERAGE PROBLEMS: ICD-10-CM

## 2024-10-21 DIAGNOSIS — E55.9 VITAMIN D DEFICIENCY: ICD-10-CM

## 2024-10-21 DIAGNOSIS — J45.20 MILD INTERMITTENT ASTHMA WITHOUT COMPLICATION: ICD-10-CM

## 2024-10-21 PROCEDURE — 99214 OFFICE O/P EST MOD 30 MIN: CPT | Mod: 25 | Performed by: FAMILY MEDICINE

## 2024-10-21 PROCEDURE — 90471 IMMUNIZATION ADMIN: CPT | Performed by: FAMILY MEDICINE

## 2024-10-21 PROCEDURE — 90662 IIV NO PRSV INCREASED AG IM: CPT | Performed by: FAMILY MEDICINE

## 2024-10-21 RX ORDER — ALBUTEROL SULFATE 90 UG/1
2 INHALANT RESPIRATORY (INHALATION) EVERY 4 HOURS PRN
Qty: 18 G | Refills: 1 | Status: SHIPPED | OUTPATIENT
Start: 2024-10-21

## 2024-10-21 NOTE — PROGRESS NOTES
Assessment & Plan     Pancreatic insufficiency  Pancreatic duct calculus  We discussed this diagnosis.  They were wondering if she would need surgery for this as they would prefer to avoid surgery given her age and medical comorbidities.  Unfortunately they have not been able to set up a GI appointment due to not having insurance yet.  Will message care coordination to look into insurance.  She has gained a little bit of weight since her last appointment    Insurance coverage problems  Messaging care coordination as above    Mild intermittent asthma without complication  She has used albuterol as needed with good effect for her respiratory symptoms, [place].  We will continue albuterol as needed for now.  Plan ultimately to get PFTs.  Her CT showed some mild diffuse bronchial wall thickening and a benign intrapulmonary lymph node and a band of scarring or atelectasis in the right lower lobe.  She has a history of smoking a pipe but does not smoke anymore.  - albuterol (PROAIR HFA/PROVENTIL HFA/VENTOLIN HFA) 108 (90 Base) MCG/ACT inhaler; Inhale 2 puffs into the lungs every 4 hours as needed for shortness of breath, wheezing or cough.    Need for vaccination  Declines COVID  - INFLUENZA HIGH DOSE, TRIVALENT, PF (FLUZONE)    Vitamin D deficiency  She was given vitamin D in the past, will plan to recheck this when she has insurance          I spent a total of 32 minutes on the day of the visit.   Time spent by me doing chart review, history and exam, documentation and further activities per the note    Follow-up as scheduled in December Subjective   Marciano is a 74 year old, presenting for the following health issues:  Establish Care (No active insurance so unable to  med. Get asthma symptoms during cold season per granddaughter.)      10/21/2024     8:30 AM   Additional Questions   Roomed by paw p   Accompanied by daughter and granddaughter in law         10/21/2024     8:30 AM  "   Services Provided    services provided? Yes   Language Lula   Type of interpretation provided Telephone       History of Present Illness       Reason for visit:  Follow up    She eats 2-3 servings of fruits and vegetables daily.She consumes 1 sweetened beverage(s) daily.She exercises with enough effort to increase her heart rate 9 or less minutes per day.  She exercises with enough effort to increase her heart rate 3 or less days per week.   She is taking medications regularly.     Asthma  - breathing is okay for most part but when it gets cold or she gets tired, it is hard for her to breathe  - since June 2024  - has never had this in the past  - no fevers  - coughs when very cold, brings up mucous, no blood  - CT chest from 8/2/2024 reviewed - mild diffuse bronchial wall thickening, band of scarring or atelectasis RLL  - only meds she has taken is the ones that she got [place] the [place]: B1-6-12, folic acid, and amoxicillin  - smoked a pipe in the past, no cigarettes  - brought inhaler from [facility] and has been using it but ran out - did have albuterol per first note with Dr. Da Silva    Pancreas - wondering about this                Review of Systems  Constitutional, HEENT, cardiovascular, pulmonary, gi and gu systems are negative, except as otherwise noted.      Objective    /81   Pulse 64   Temp 98.5  F (36.9  C) (Oral)   Resp 20   Ht 1.366 m (4' 5.78\")   Wt 35.4 kg (78 lb)   SpO2 96%   BMI 18.96 kg/m    Body mass index is 18.96 kg/m .  Wt Readings from Last 3 Encounters:   10/21/24 35.4 kg (78 lb)   08/21/24 33.1 kg (73 lb 1.3 oz)   07/25/24 32.1 kg (70 lb 12 oz)         Physical Exam   General: well-appearing, no acute distress  HEENT: normocephalic, atraumatic  Eyes: conjunctivae normal  Neck: normal ROM  Cardiovascular: regular rate and rhythm, normal S1 and S2, no murmurs/rubs/gallops  Pulmonary: no increased work of breathing, clear to auscultation " bilaterally, no wheezes/rales/rhonchi  Musculoskeletal: normal ROM, no deformity  Neurological: gross motor exam normal  Skin: no rashes or lesions              Signed Electronically by: Maia Walton MD

## 2024-10-21 NOTE — Clinical Note
Patient was seen today in clinic and family says that she still does not have active insurance. I was wondering if there was any update on this? She also needs an albuterol inhaler - I sent to [facility] pharmacy - is there a way to get her set up with a short term supply for free or reduced price? Thank you!

## 2024-10-22 ENCOUNTER — PATIENT OUTREACH (OUTPATIENT)
Dept: CARE COORDINATION | Facility: CLINIC | Age: 74
End: 2024-10-22

## 2024-10-22 DIAGNOSIS — Z71.89 OTHER SPECIFIED COUNSELING: Primary | Chronic | ICD-10-CM

## 2024-10-22 NOTE — PROGRESS NOTES
Clinic Care Coordination Contact      Direct care provided in primary language, no  needed.     Follow Up Progress Note      Assessment: CCRN received a message from PCP as below:  Maia Walton MD Dah, Nadia, RN; Chino Schultz CHW  Patient was seen today in clinic and family says that she still does not have active insurance. I was wondering if there was any update on this? She also needs an albuterol inhaler - I sent to [facility] pharmacy - is there a way to get her set up with a short term supply for free or reduced price? Thank you!    Clinic staff reached out to ZORA Bloom today. Received update from Deisy as below:    Thank you for reaching out. I don't have any immediate updates. Both myself and the RA have been calling the county and leaving voice messages to try to engage them in processing this application. I am due to meet with [facility] to review several cases on Friday of this week. I can offer updates to you and Ana on Friday.     Kindly,  ZORA Mcdaniels   Health   Refugee [facility]    [facility]    [employer] Phone: [phone]   Fax: [phone]     Prescription Albuterol inhaler - CCRN spoke with  pharmacy tech at [facility] today to inquire how much out of pocket to fill albuterol inhaler. Was told with coupon $58.42. Updated patient's daughter. They agreed to pay. Grandson- Howard Moo plans to pick it up later today. Instructed daughter to call CCRN directly if unable to  the inhaler. Also instructed grand child to ask a pharmacist to show how to use the inhaler or call CCRN if education needed. Verbalized understanding.     Care Plans  Care Plan: Gastroenterology       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       Start Date: 8/6/2024 Expected End Date:  12/31/2024    This Visit's Progress: 10%    Note:     Barriers: language barrier, low literacy, noncompliance, and lack of knowledge how to navigate complex health care system  Strengths: motivated to attend appt  Patient expressed understanding of goal: Yes    Action steps to achieve this goal:  1. I will answer my phone when I am contacted to schedule my appointment.  2. I will attend my gastroenterology clinic appointment as scheduled  TBD  3. I will schedule a follow up appointment with my provider if it is recommended to do so while I am at the clinic.  4. I will follow up with [facility] regarding this goal at each outreach until it is completed.                               Care Plan: Health Insurance       Problem: No active insurance       Goal: Patient would like to apply for health insurance in the next 90 days.       Start Date: 10/22/2024 Expected End Date: 1/31/2025    This Visit's Progress: 10%    Note:     Barriers: languate   Strengths: Accepting of support,    Patient expressed understanding of goal: Yes     Action steps to achieve this goal:   1.  I will answer my phone when I am contacted by the [facility] FRW to schedule an initial appointment.   2.  I will provide all necessary documentation and information to [place] a [place].   3.  I will call my FRW if I have questions or concerns regarding my county benefits application.                                 Outreach Frequency: 6 weeks, more frequently as needed    Plan: Grandchild will  albuterol inhaler at [facility] today.

## 2024-10-22 NOTE — LETTER
V2.0    [facility] Progress Note      Name:  Cindi Rodriguez /Age/Sex: 1967  (57 y.o. female)   MRN & CSN:  [id number] & [id number] Encounter Date/Time: 2024 10:37 AM EDT   Location:  6301/6301-01 PCP: Alessandra Anderson DO     Attending:Wilberto Potter MD       Hospital Day: 4    Assessment and Recommendations   Cindi Rodriguez is a 57 y.o. female with pmh of end-stage renal disease, type 2 diabetes, hypertension and asthma who presented to the emergency room with complaints of abdominal pain.      Patient had been seen at [facility] on  for generalized weakness.  Patient had not attended dialysis for the past 2 weeks.      Plan:   Acute metabolic encephalopathy-patient with multiple missed dialysis sessions.  Peritonitis ruled out blood cultures pending.  Urine drug screen demonstrated cocaine.   ESRD- recently transitioned from PD to HD  Abdominal pain- CT imaging without acute process.  Await cell count from PD fluid  Type 2 diabetes- Medium dose sliding scale with hypoglycemia    Patient more alert today. Surgery consulted for removal of PD cath.   Diet ADULT DIET; Regular; Low Fat/Low Chol/High Fiber/2 gm Na   DVT Prophylaxis [] Lovenox, []  Heparin, [] SCDs, [] Ambulation,  [] Eliquis, [] Xarelto  [] Coumadin   Code Status Full Code   Disposition From: home  Expected Disposition: TBD   Estimated Date of Discharge: 2-3 days  Patient requires continued admission due to acute encephalopathy   Surrogate Decision Maker/ POA  Per EMR     Personally reviewed Lab Studies and Imaging   2024  Point-of-care glucose reviewed.  Phosphorus, CBC, BMP ordered and pending as patient refused lab draw earlier this morning.      On 2024 drugs that require monitoring for toxicity include insulin and the method of monitoring was POCT glucose to evaluate for hypoglycemia.    Discussed care of the patient with neurology.  No evidence of dialysis disequilibrium syndrome currently.  Monitor for improvement  [facility]  Patient Centered Plan of Care  About Me:        Patient Name:  Marciano Story    YOB: 1950  Age:         74 year old   Kenyatta MRN:    [id number] Telephone Information:  Home Phone [phone]   Mobile [phone]   Mobile [phone]       Address:  [address] [place] [address] Email address:  No e-mail address on record      Emergency Contact(s)    Name Relationship Lgl Grd Work Phone Home Phone Mobile Phone   1. DANIEL SELBY Grandchild  [phone]     2. JOSE JUAN SELBY Grandchild  [phone]             Primary language:  Lula     needed? Yes   [employer] [employer]:  [phone] op. 1  Other communication barriers:Language barrier; Physical impairment    Preferred Method of Communication:     Current living arrangement: I live in a private home with family    Mobility Status/ Medical Equipment: Independent        Health Maintenance  Health Maintenance Reviewed: Due/Overdue       My Access Plan  Medical Emergency 911   Primary Clinic Line [place] [phone]   24 Hour Appointment Line [phone] or  [phone]) (toll-free)   24 Hour Nurse Line [phone] (toll-free)   Preferred Urgent Care [facility] [phone]     [facility]  [phone]     Preferred Pharmacy [facility] - [place], [place] - [address]     Behavioral Health Crisis Line The National Suicide Prevention Lifeline at 1-365.985.4903 or Text/Call 328           My Care Team Members  Patient Care Team         Relationship Specialty Notifications Start End    Maia Walton MD PCP - General Family Medicine  10/21/24     Phone: [phone] Fax: [phone]         [address] , [address] [facility] [address]    Chino Schultz CHW Community Health Worker Primary Care - CC Admissions 7/25/24     Phone: [phone] Fax: [phone]         [address] [place], [place] [address]an [facility] [address]  [facility] [address]    Ana Blood, RN Lead Care Coordinator Primary Care - CC Admissions 7/25/24     Phone: [phone]         Megan Hooks MD Assigned PCP   9/23/24     Phone: [phone] Fax: [phone]         [address] [place] [address] [place] [address]                My Care Plans  Self Management and Treatment Plan    Care Plan  Care Plan: Gastroenterology       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       Start Date: 8/6/2024 Expected End Date: 12/31/2024    This Visit's Progress: 10%    Note:     Barriers: language barrier, low literacy, noncompliance, and lack of knowledge how to navigate complex health care system  Strengths: motivated to attend appt  Patient expressed understanding of goal: Yes    Action steps to achieve this goal:  1. I will answer my phone when I am contacted to schedule my appointment.  2. I will attend my gastroenterology clinic appointment as scheduled  TBD  3. I will schedule a follow up appointment with my provider if it is recommended to do so while I am at the clinic.  4. I will follow up with [facility] regarding this goal at each outreach until it is completed.                               Care Plan: Health Insurance       Problem: No active insurance       Goal: Patient would like to apply for health insurance in the next 90 days.       Start Date: 10/22/2024 Expected End Date: 1/31/2025    This Visit's Progress: 10%    Note:     Barriers: languate   Strengths: Accepting of support,    Patient expressed understanding of goal: Yes     Action steps to achieve this goal:   1.  I will answer my phone when I am contacted by the [facility] FRW to schedule an initial appointment.   2.  I will provide all necessary documentation and information to complete a [place] application.   3.  I will call my FRW if I have questions or concerns regarding my county benefits application.                                  Action Plans on File:                       Advance Care Plans/Directives:   Advanced Care Plan/Directives on file: No    Discussed with patient/caregiver(s): Declined Further Information             My Medical and Care Information  Problem List   Patient Active Problem List   Diagnosis    Cardiomegaly    Cortical age-related cataract of both eyes    Anemia in other chronic diseases classified elsewhere    Essential hypertension    Pulmonary TB    Smoker    Mixed conductive and sensorineural hearing loss of both ears    Pancreatic insufficiency    Medically complex patient    Vitamin D deficiency      Current Medications:  Please refer to the most recent medication list provided to you by your medical team and reach out to your provider with any questions or to make any corrections.    Care Coordination Start Date: 7/25/2024   Frequency of Care Coordination: monthly, more frequently as needed     Form Last Updated: 11/08/2024

## 2024-10-23 ENCOUNTER — PATIENT OUTREACH (OUTPATIENT)
Dept: CARE COORDINATION | Facility: CLINIC | Age: 74
End: 2024-10-23

## 2024-11-04 NOTE — TELEPHONE ENCOUNTER
Completed DMV form mailed to [place] DM[employer] as requested by provider.    Recommend starting a statin which patient declines.  ASCVD risk score is 12.7%.

## 2024-11-05 ENCOUNTER — PATIENT OUTREACH (OUTPATIENT)
Dept: CARE COORDINATION | Facility: CLINIC | Age: 74
End: 2024-11-05

## 2024-11-05 NOTE — PROGRESS NOTES
Clinic Care Coordination Contact  [facility]/Voicemail    Clinical Data: Care Coordinator Outreach    Outreach Documentation Number of Outreach Attempt   11/5/2024  10:45 AM 1     Left message on patient's voicemail with call back information and requested return call.    Plan: Care Coordinator will try to reach patient again in two weeks.    [place], called refugee resettlement agency  and was told patient was discharged after 90 days and was also informed that health insurance is not active.

## 2024-11-20 ENCOUNTER — PATIENT OUTREACH (OUTPATIENT)
Dept: CARE COORDINATION | Facility: CLINIC | Age: 74
End: 2024-11-20
Payer: MEDICAID

## 2024-11-20 DIAGNOSIS — Z71.89 OTHER SPECIFIED COUNSELING: Primary | Chronic | ICD-10-CM

## 2024-11-20 NOTE — PROGRESS NOTES
Clinic Care Coordination Contact  Community Health Worker Follow Up    Care Gaps:   Health Maintenance Due   Topic Date Due    DEXA  Never done    ASTHMA ACTION PLAN  Never done    ASTHMA CONTROL TEST  Never done    ADVANCE CARE PLANNING  Never done    Pneumococcal Vaccine: 65+ Years (1 of 2 - PCV) Never done    COLORECTAL CANCER SCREENING  Never done    ZOSTER IMMUNIZATION (1 of 2) Never done    MEDICARE ANNUAL WELLNESS VISIT  Never done    RSV VACCINE (1 - 1-dose 75+ series) Never done    COVID-19 Vaccine (1 - 2024-25 season) Never done     Care Gaps Last addressed on 10/21/2024.    Care Plan:   Care Plan: Gastroenterology       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       Start Date: 8/6/2024 Expected End Date: 12/31/2024    This Visit's Progress: 10% Recent Progress: 10%    Note:     Barriers: language barrier, low literacy, noncompliance, and lack of knowledge how to navigate complex health care system  Strengths: motivated to attend appt  Patient expressed understanding of goal: Yes    Action steps to achieve this goal:  1. I will answer my phone when I am contacted to schedule my appointment.  2. I will attend my gastroenterology clinic appointment as scheduled  TBD  3. I will schedule a follow up appointment with my provider if it is recommended to do so while I am at the clinic.  4. I will follow up with CCC regarding this goal at each outreach until it is completed.     Note: CHW called GI scheduling line and was told this patient was recommended to do ERCP procedure. CHW left a voice message to call back for coordination.                             Care Plan: Establish care with a PCP Completed 10/22/2024      Problem: establish care  Resolved 10/22/2024      Goal: Patient will establish care with a PCP in the next 5 months.  Completed 10/22/2024      Start Date: 8/6/2024 Expected End Date:  12/31/2024    This Visit's Progress: 100% Recent Progress: 10%    Note:     Barriers: language barrier, low literacy, noncompliance, and lack of knowledge how to navigate complex health care system  Strengths: motivated to attend appt  Patient expressed understanding of goal: Yes    Action steps to achieve this goal:  1. I will answer my phone when I am contacted to schedule my appointment.  2. I will attend my appointment with Dr Farris to establish care as scheduled on 10/21/2024. Completed.  3. I will schedule a follow up appointment with my PCP if it is recommended to do so while I am at the clinic.  4. I will follow up with [facility] regarding this goal at each outreach until it is completed.                               Care Plan: Health Insurance       Problem: No active insurance       Goal: Patient would like to apply for health insurance in the next 90 days.  Completed 11/20/2024      Start Date: 10/22/2024 Expected End Date: 1/31/2025    This Visit's Progress: 100% Recent Progress: 10%    Note:     Barriers: languate   Strengths: Accepting of support,    Patient expressed understanding of goal: Yes     Action steps to achieve this goal:   1.  I will answer my phone when I am contacted by the [facility] FRW to schedule an initial appointment.   2.  I will provide all necessary documentation and information to complete a [place] application.   3.  I will call my FRW if I have questions or concerns regarding my county benefits application.     Patient's health [place] is active with [place].                          Intervention and Education during outreach:   -Patient's health insurance is active with [facility] [employer] and eligibility begins date on 8/01/2024.  -CHW called [facility] billing department and updated insurance to resolve outstanding bill balance. Billing was able to back dated for August 2024 visit dates but unable to back date for July 2024 due patient's health insurance eligibility begins date on  8/01/2024.   -CHW re-referred to FRW inquiring to change eligibility begins date as of July 2024 so the billing can back dated.   -CHW attempted to refer patient for PCA assessment but unable to and per [place] that it will be best to call managed care line after December 1st to see if patient is enrolled in [place] [facility] due to patient is 65 and older.   -CHW attempted to call GI to schedule initial appointment but was transferred to ERCP scheduling line and had to leave a voice message to call back for coordination.     CHW Next Outreach: In one month.    [place] Department of Human Services: [facility] Care Programs Eligibility Response (335)    SUBSCRIBER INFORMATION   Date of Service Subscriber ID Subscriber Name Birthdate Age Gender   11/20/2024 [id number] [facility] 1950 74 FEMALE          Address   [address] , , [place]  [address]          PROVIDER INFORMATION   Provider ID Submitter Transaction ID Provider Name Taxonomy Code Qualifier Taxonomy Code   [id number] xx [facility] Programs   This subscriber has eligibility for [place]: Medical [place].  Elig Type EX: Over age 65  Eligibility Begin Date: 08/01/2024  Eligibility End Date: --/--/----  This subscriber is eligible for the following service types: Medical Care ,  Chiropractic ,  Dental Care ,  Hospital ,  Hospital - Inpatient ,  Hospital - Outpatient ,  Emergency Services ,  Pharmacy ,  Professional (Physician) Visit - Office ,  Vision (Optometry) ,  Mental Health ,  Urgent Care   Prepaid Health Plan   None       Other Eligibility Information   No Special Transportation.  This subscribers eligibility is not determined for Long term Care and waiver services.  No Hospice.  County of residence is unknown.  Refer to Health Care Programs and Services Overview of the [facility] Provider Manual for a list of covered services.   Waivers   None     Subscriber Responsibility Information   None     Restricted  Recipient Program   None       Medicare   None

## 2024-11-22 ENCOUNTER — PATIENT OUTREACH (OUTPATIENT)
Dept: GASTROENTEROLOGY | Facility: CLINIC | Age: 74
End: 2024-11-22
Payer: MEDICAID

## 2024-11-22 NOTE — PROGRESS NOTES
Received the following message: Kenyatta Hall calling to schedule an ERCP for this patient. The person who called said he can be reached at [phone].     Returned call to the number provided. RADHA with my name and contact information; requested return call to clinic.     Patient was referred to our service in August 2024. Recommendations per Dr. Rm to proceed with ERCP for pancreatic duct stone. Care was postponed until medical insurance was active. Patient now has an active insurance plan on file.     Darcy Leonard RN Care Coordinator

## 2024-12-03 ENCOUNTER — PATIENT OUTREACH (OUTPATIENT)
Dept: CARE COORDINATION | Facility: CLINIC | Age: 74
End: 2024-12-03
Payer: MEDICAID

## 2024-12-03 NOTE — PROGRESS NOTES
Clinic Care Coordination Contact  Care Coordination Clinician Chart Review    Situation: Patient chart reviewed by Care Coordinator.       Background: Care Coordination Program started: 7/25/2024. Initial assessment completed and patient-centered care plan(s) were developed with participation from patient. Lead CC handed patient off to CHW for continued outreaches.       Assessment: Per chart review, patient outreach completed by CC CHW on 11/22/24.  Patient is actively working to accomplish goal(s). Patient's goal(s) appropriate and relevant at this time. Patient is not due for updated Plan of Care.  Assessments will be completed annually or as needed/with change of patient status.    GI clinic appointment - CCRN tried calling GI clinic today [phone] but on hold > 25 mins. Will ask CHW to call tomorrow to assist patient schedule appt.       Care Plan: Gastroenterology       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       Start Date: 8/6/2024 Expected End Date: 12/31/2024    This Visit's Progress: 10% Recent Progress: 10%    Note:     Barriers: language barrier, low literacy, noncompliance, and lack of knowledge how to navigate complex health care system  Strengths: motivated to attend appt  Patient expressed understanding of goal: Yes    Action steps to achieve this goal:  1. I will answer my phone when I am contacted to schedule my appointment.  2. I will attend my gastroenterology clinic appointment as scheduled  TBD  3. I will schedule a follow up appointment with my provider if it is recommended to do so while I am at the clinic.  4. I will follow up with CCC regarding this goal at each outreach until it is completed.     Note: CHW called GI scheduling line and was told this patient was recommended to do ERCP procedure. CHW left a voice message to call back for coordination.                              Care Plan: Health Insurance       Problem: No active insurance                        Plan/Recommendations: The patient will continue working with Care Coordination to achieve goal(s) as above. CHW will continue outreaches at minimum every 30 days and will involve Lead CC as needed or if patient is ready to move to Maintenance. Lead CC will continue to monitor CHW outreaches and patient's progress to goal(s) every 6 weeks.     Plan of Care updated and sent to patient: No

## 2024-12-04 ENCOUNTER — PATIENT OUTREACH (OUTPATIENT)
Dept: CARE COORDINATION | Facility: CLINIC | Age: 74
End: 2024-12-04
Payer: MEDICAID

## 2024-12-04 NOTE — PROGRESS NOTES
FRW Update   12/04/24 Outreach attempted x 2. Left message on voicemail indicating last outreach attempt.   Plan: FRW closed the FRW program, sent letter. Patient can be referred back to [facility] if needed.

## 2024-12-04 NOTE — PROGRESS NOTES
Clinic Care Coordination Contact  Community Health Worker Follow Up    Care Gaps:   Health Maintenance Due   Topic Date Due    DEXA  Never done    ASTHMA ACTION PLAN  Never done    ASTHMA CONTROL TEST  Never done    ADVANCE CARE PLANNING  Never done    Pneumococcal Vaccine: 65+ Years (1 of 2 - PCV) Never done    COLORECTAL CANCER SCREENING  Never done    ZOSTER IMMUNIZATION (1 of 2) Never done    MEDICARE ANNUAL WELLNESS VISIT  Never done    RSV VACCINE (1 - 1-dose 75+ series) Never done    COVID-19 Vaccine (1 - 2024-25 season) Never done     Scheduled on 12/23/2024 for preventive care visit.      Care Plan:   Care Plan: Gastroenterology       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       Start Date: 8/6/2024 Expected End Date: 12/31/2024    This Visit's Progress: 10% Recent Progress: 10%    Note:     Barriers: language barrier, low literacy, noncompliance, and lack of knowledge how to navigate complex health care system  Strengths: motivated to attend appt  Patient expressed understanding of goal: Yes    Action steps to achieve this goal:  1. I will answer my phone when I am contacted to schedule my appointment.  2. I will attend my gastroenterology clinic appointment as scheduled. Pending...  3. I will schedule a follow up appointment with my provider if it is recommended to do so while I am at the clinic.  4. I will follow up with CCC regarding this goal at each outreach until it is completed.     Note: CHW called GI procedure RN care coordinator at [phone] and left another voice message to call back to coordinate ERCP procedure. CHW also sent staff message to Darcy Leonard RN care coordinator requesting to call back.                             Care Plan: Health Insurance       Problem: No active insurance                 Intervention and Education during outreach:   -CHW reviewed outstanding  bill balance and it appear there's no outstanding balance on the account.   -CHW called GI RN care coordinator at [phone] and left a voice message to call back to coordinate ERCP procedure.   -Patient and family members were informed to call with questions or concerns.     CHW Next Outreach: In one month.

## 2024-12-04 NOTE — LETTER
[facility] CARE COORDINATION        December 4, 2024      [facility]  [address] [place] [address]        Dear [facility],                                                                      The Financial Resource team has attempted to reach to you to assist you with any financial barriers you may be facing in your healthcare journey.  We would like to provide any additional support you may need related to financial support for your healthcare.  Our team are Certified MNSure Navigators, and we offer support with application assistance for Medical Assistance, MNSure Applications, Energy Assistance, Emergency Assistance, Food Assistance and many other initial applications for [facility] benefits.     I would appreciate if you would call me at [phone] to let me know if you would like assistance.      Sincerely,                                                                         Anita Weinstein MA

## 2024-12-05 NOTE — PROGRESS NOTES
Spoke with Chino, Community Health Worker with [facility]. We attempted to get patient on the line at time of call; spoke with her daughter with Chino's assistance to interpret. They report that patient is elderly and is unable to come to the phone.     Patient's daughter declined to schedule procedure, states that patient is elderly [place].     Reviewed with Chino's assistance the rationale for referral and procedural recommendations. She states that her mother is doing well, has a good appetite and is sleeping well. States that she is no longer losing weight.     Discussed that we would close referral at this time. Advised they seek medical attention in the event of new onset abdominal pain or fevers.     Darcy Leonard, RN Care Coordinator

## 2024-12-05 NOTE — PROGRESS NOTES
Received messaging from Chino requesting to assist in the scheduling of ERCP for patient. Attempted to reach. LVM with clinic contact information.     Darcy Leonard RN Care Coordinator

## 2024-12-23 ENCOUNTER — PATIENT OUTREACH (OUTPATIENT)
Dept: NURSING | Facility: CLINIC | Age: 74
End: 2024-12-23
Payer: MEDICAID

## 2024-12-23 NOTE — PROGRESS NOTES
Clinic Care Coordination Contact  Follow Up Progress Note      Assessment: Patient no showed in person appointment with CCRN today because transportation didn't show up. CCRN switched in person visit to telephone visit. CCRN spoke with patient and daughter to follow up on EGD procedure patient declined to schedule. CCRN educated patient and daughter on the important of GI consult or EGD procedure but patient continues to decline to schedule. Patient states she is doing fine for now and will think about it when she starts to feel the symptoms. States weight is stable and she always this skinny. Instructed patient to follow up her PCP on 2/11/25.     Advised that if patient is experiencing pain, fevers or is unable to eat or drink, she should be brought to the ED.    Plan: Patient is graduated from CC today.

## 2025-01-29 ENCOUNTER — PATIENT OUTREACH (OUTPATIENT)
Dept: GERIATRIC MEDICINE | Facility: CLINIC | Age: 75
End: 2025-01-29
Payer: MEDICAID

## 2025-01-29 NOTE — PROGRESS NOTES
[facility] Care Coordination Contact    [facility] Initial Assessment     Home visit for Initial Health Risk Assessment with Marciano Story completed on January 29, 2025    Type of residence:: Private home - stairs  Current living arrangement:: I live in a private home with family     Assessment completed with:: Patient, Family, Children    Current Care Plan  Member currently receiving the following home care services:     Member currently receiving the following community resources: None    Medication Review  Medication reconciliation completed in Epic: Yes  Medication set-up & administration: Family/informal caregiver sets up daily.  Family caregiver administers medications.  Medication Risk Assessment Medication (1 or more, place referral to MTM): N/A: No risk factors identified  MTM Referral Placed: No: No risk factors idenified    Mental/Behavioral Health   Depression Screening:   PHQ-2 Total Score (Adult) - Positive if 3 or more points; Administer PHQ-9 if positive: 0       Mental health DX:: No        No current  services-will place referral for  Declined     Falls Assessment:   Fallen 2 or more times in the past year?: No   Any fall with injury in the past year?: No    ADL/IADL Dependencies:   Dependent ADLs:: Ambulation-no assistive device, Bathing, Dressing, Grooming, Incontinence, Toileting  Dependent IADLs:: Cleaning, Cooking, Laundry, Shopping, Meal Preparation, Medication Management, Money Management, Transportation, Incontinence    Health Plan sponsored benefits: [employer]: Shared information regarding preventative health screening and health plan supplemental benefits/incentives. Reviewed medication disposal form and transition of care member handout.    CFSS Assessment completed at visit: Yes Initial CFSS Assessment indicated 4hrs hours per day of CFSS. Member and family has no preference in consultation agency and referral was made to [facility] consultation.  Care coordinator  "explained initial cfss requires consultation services prior to starting and it may take a few months while this is in transition.  Family will continue to provide informal support daily. [facility] once CFSS is approved. RP for CFSS will be granddaughter-in-law Logan Pineda.    Elderly Waiver Eligibility: Yes-will open to EW.  Member feels isolated and would like to attend adult day care to meet new people.  Care coordinator submitted DHS [id number] and [id number] to [facility].  Care coordinator explained to RP that this process may take a few months as we wait for [facility] approval.      Care Plan & Recommendations: Marciano is quiet and pleasant [place] woman who recently immigrated from refugee [place] in [place] to [place] 7/2024.  She is alert to person and place and preferred questions be directed to family members.  Family report poor short-term memory recall.  \"She gets sad and cries and is emotional when she talks about her life in refugee [place].\"  No SI or threats to harm others.  CC discussed [facility] services which was declined.  Family feel Marciano needs more time to [place] to life in states.  Family will continue to help manage all her ADL/IADLs while we wait for [facility] services.  Falls prevention discussed.  Marciano has eye appointment and follow up with PCP in next few weeks.  Writer encouraged family help all medical appointments. Marciano would like following DME: pullups, tub bench and cane.  CC will help coordinate with PCP for RX and order with DME vendor.     See [facility] Assessment for detailed assessment information.    Follow-Up Plan: Member informed of future contact, plan to f/u with member with a 6 month telephone assessment.  Contact information shared with member and family, encouraged member to call with any questions or concerns at any time.    [place] care continuum providers: Please see Snapshot and Care Management Flowsheets for Specific details of care plan.    This CC note " routed to PCP, Maia Walton Mai, RN N  [facility] Coordinator  [phone]

## 2025-01-29 NOTE — Clinical Note
Home visit completed for initial health risk assessment for community services and supports.  Marciano is doing well living with family and continues to have daily support from them.  I have approved CFSS (formerly PCA program) but she will have to wait until she receives consultation services per [facility].  I will submit paperwork to [facility] for adult day care.  Marciano would like: pull-ups, tub bench and cane.  If appropriate, please sign separate RX and send it back to me and I will help place with [facility].   Thanks Carmen Champagne RN PHN [facility] Care Coordinator [phone]

## 2025-01-30 ENCOUNTER — PATIENT OUTREACH (OUTPATIENT)
Dept: GERIATRIC MEDICINE | Facility: CLINIC | Age: 75
End: 2025-01-30
Payer: MEDICAID

## 2025-01-30 NOTE — PROGRESS NOTES
[facility] Care Coordination Contact    Received after visit chart from care coordinator.  Completed following tasks: Mailed copy of support plan to member, Mailed [place] Choices signature sheet pages 3-4, Mailed Safe Medication Disposal , and Updated services in Database.Mail out HCD worksheet to member  .  [facility]:  Emailed required Consultation Service documents to [facility], NO CFSS/PCA agency has been chosen at this time.  Mailed member supplemental summary chart and assessment summary letter.     Kellen Cabrales  Care Management Specialist  [facility]  [phone]

## 2025-01-30 NOTE — LETTER
January 30, 2025       [facility]  [address] [place] [address]      Dear Marciano,    At [facility], we re dedicated to improving your health and wellness. Enclosed is the Support Plan developed with you on 01/29/2025. Please review the Support Plan carefully.    As a reminder, during your visit we talked about:   Ways to manage your physical and mental health   Using health care to maintain and improve your health    Your preventive care needs      Remember to contact your care coordinator if you:   Are hospitalized or plan to be hospitalized    Have a fall     Have a change in your physical or mental health   Need help finding support or services    If you have questions or don t agree with your Support Plan, call me at [phone]. You can also call me if your needs change. TTY users call the [place] Relay at 911 or 1-786.653.9582 (vvlgcv-mz-mojbwi relay service).    Sincerely,         Carmen Champagne RN, PHN  [phone]  [email or web address]                [id number] accepted     (06/2024)                [address] [place] [address]  [phone]  fax [phone]  [facility] [place]

## 2025-02-11 ENCOUNTER — TELEPHONE (OUTPATIENT)
Dept: FAMILY MEDICINE | Facility: CLINIC | Age: 75
End: 2025-02-11

## 2025-02-11 ENCOUNTER — OFFICE VISIT (OUTPATIENT)
Dept: FAMILY MEDICINE | Facility: CLINIC | Age: 75
End: 2025-02-11
Payer: COMMERCIAL

## 2025-02-11 VITALS
DIASTOLIC BLOOD PRESSURE: 80 MMHG | HEART RATE: 70 BPM | RESPIRATION RATE: 16 BRPM | WEIGHT: 84.25 LBS | HEIGHT: 55 IN | SYSTOLIC BLOOD PRESSURE: 144 MMHG | BODY MASS INDEX: 19.5 KG/M2 | TEMPERATURE: 98.2 F | OXYGEN SATURATION: 97 %

## 2025-02-11 DIAGNOSIS — I10 ESSENTIAL HYPERTENSION: ICD-10-CM

## 2025-02-11 DIAGNOSIS — Z23 NEED FOR VACCINATION: ICD-10-CM

## 2025-02-11 DIAGNOSIS — K86.89 PANCREATIC INSUFFICIENCY: ICD-10-CM

## 2025-02-11 DIAGNOSIS — I51.7 CARDIOMEGALY: ICD-10-CM

## 2025-02-11 DIAGNOSIS — Z02.89 REFUGEE HEALTH EXAMINATION: ICD-10-CM

## 2025-02-11 DIAGNOSIS — J45.20 MILD INTERMITTENT ASTHMA WITHOUT COMPLICATION: ICD-10-CM

## 2025-02-11 DIAGNOSIS — N64.4 BREAST PAIN, LEFT: ICD-10-CM

## 2025-02-11 DIAGNOSIS — D63.8 ANEMIA IN OTHER CHRONIC DISEASES CLASSIFIED ELSEWHERE: ICD-10-CM

## 2025-02-11 DIAGNOSIS — E55.9 VITAMIN D DEFICIENCY: ICD-10-CM

## 2025-02-11 DIAGNOSIS — H61.23 BILATERAL IMPACTED CERUMEN: ICD-10-CM

## 2025-02-11 DIAGNOSIS — E78.2 MIXED HYPERLIPIDEMIA: ICD-10-CM

## 2025-02-11 DIAGNOSIS — Z00.00 ROUTINE GENERAL MEDICAL EXAMINATION AT A HEALTH CARE FACILITY: Primary | ICD-10-CM

## 2025-02-11 DIAGNOSIS — H90.6 MIXED CONDUCTIVE AND SENSORINEURAL HEARING LOSS OF BOTH EARS: ICD-10-CM

## 2025-02-11 DIAGNOSIS — N63.24 MASS OF LOWER INNER QUADRANT OF LEFT BREAST: ICD-10-CM

## 2025-02-11 DIAGNOSIS — H25.013 CORTICAL AGE-RELATED CATARACT OF BOTH EYES: ICD-10-CM

## 2025-02-11 PROBLEM — F17.200 SMOKER: Status: RESOLVED | Noted: 2024-08-19 | Resolved: 2025-02-11

## 2025-02-11 LAB
FOLATE SERPL-MCNC: 4.3 NG/ML (ref 4.6–34.8)
HGB BLD-MCNC: 12.9 G/DL (ref 11.7–15.7)

## 2025-02-11 PROCEDURE — T1013 SIGN LANG/ORAL INTERPRETER: HCPCS

## 2025-02-11 RX ORDER — ACETAMINOPHEN 325 MG/1
325-650 TABLET ORAL EVERY 6 HOURS PRN
Qty: 100 TABLET | Refills: 2 | Status: SHIPPED | OUTPATIENT
Start: 2025-02-11

## 2025-02-11 RX ORDER — IBUPROFEN 400 MG/1
400 TABLET, FILM COATED ORAL EVERY 6 HOURS PRN
Qty: 100 TABLET | Refills: 2 | Status: SHIPPED | OUTPATIENT
Start: 2025-02-11

## 2025-02-11 SDOH — HEALTH STABILITY: PHYSICAL HEALTH: ON AVERAGE, HOW MANY DAYS PER WEEK DO YOU ENGAGE IN MODERATE TO STRENUOUS EXERCISE (LIKE A BRISK WALK)?: 3 DAYS

## 2025-02-11 ASSESSMENT — ASTHMA QUESTIONNAIRES
ACT_TOTALSCORE: 24
QUESTION_3 LAST FOUR WEEKS HOW OFTEN DID YOUR ASTHMA SYMPTOMS (WHEEZING, COUGHING, SHORTNESS OF BREATH, CHEST TIGHTNESS OR PAIN) WAKE YOU UP AT NIGHT OR EARLIER THAN USUAL IN THE MORNING: NOT AT ALL
QUESTION_4 LAST FOUR WEEKS HOW OFTEN HAVE YOU USED YOUR RESCUE INHALER OR NEBULIZER MEDICATION (SUCH AS ALBUTEROL): NOT AT ALL
QUESTION_5 LAST FOUR WEEKS HOW WOULD YOU RATE YOUR ASTHMA CONTROL: WELL CONTROLLED
ACT_TOTALSCORE: 24
QUESTION_2 LAST FOUR WEEKS HOW OFTEN HAVE YOU HAD SHORTNESS OF BREATH: NOT AT ALL
QUESTION_1 LAST FOUR WEEKS HOW MUCH OF THE TIME DID YOUR ASTHMA KEEP YOU FROM GETTING AS MUCH DONE AT WORK, SCHOOL OR AT HOME: NONE OF THE TIME

## 2025-02-11 ASSESSMENT — SOCIAL DETERMINANTS OF HEALTH (SDOH): HOW OFTEN DO YOU GET TOGETHER WITH FRIENDS OR RELATIVES?: NEVER

## 2025-02-11 NOTE — TELEPHONE ENCOUNTER
Forms/Letter Request    Type of form/letter:       Is Release of Information needed?: Yes  Was an MADDY obtained?  Yes    Do we have the form/letter: Yes: Dr. Kaleigh beard    Who is the form from? [facility] (if other please explain)    Where did/will the form come from? Patient or family brought in       When is form/letter needed by: Earliest convenience    How would you like the form/letter returned: Fax : [phone]    Patient Notified form requests are processed in 5-7 business days:Yes    Okay to leave a detailed message?: Yes at Home number on file [phone] (home)

## 2025-02-11 NOTE — PROGRESS NOTES
Preventive Care Visit  [facility] MD, Family Medicine  Feb 11, 2025      Assessment & Plan     Routine general medical examination at a health care facility  Labs, screenings, and vaccines as ordered and counseling as detailed below.    Vitamin D deficiency  Check vit D - not taking right now. Has had a fall this year. Check DEXA.  - DEXA HIP/PELVIS/SPINE - Future; Future  - Vitamin D Deficiency; Future  - Vitamin D Deficiency    Mixed conductive and sensorineural hearing loss of both ears  - Adult Audiology  Referral; Future    Anemia in other chronic diseases classified elsewhere  Check hemoglobin and other labs as ordered  - Hemoglobin; Future  - Ferritin; Future  - Folate; Future  - Hemoglobin  - Ferritin  - Folate    Refugee health examination  Schisto was indeterminate - will repeat  - Schistosoma Antibody IgG; Future  - Schistosoma Antibody IgG    Breast pain, left  Check US an diagnostic mammo. Use tylenol and ibuprofen prn pain.  - acetaminophen (TYLENOL) 325 MG tablet; Take 1-2 tablets (325-650 mg) by mouth every 6 hours as needed for mild pain.  - ibuprofen (ADVIL/MOTRIN) 400 MG tablet; Take 1 tablet (400 mg) by mouth every 6 hours as needed for moderate pain.  - US Breast Bilateral Limited 1-3 Quadrants; Future  - MA Diagnostic Bilateral w/ Moshe; Future    Bilateral impacted cerumen  Offered irrigation versus debrox and she chose debrox  - carbamide peroxide (DEBROX) 6.5 % otic solution; Place 5 drops into both ears 2 times daily for 3 days.    Need for vaccination  Declines COVID and zoster.  - Pneumococcal 20 Valent Conjugate (PCV20)  - RSV VACCINE (ABRYSVO)    Mass of lower inner quadrant of left breast  See above  - US Breast Bilateral Limited 1-3 Quadrants; Future  - MA Diagnostic Bilateral w/ Moshe; Future    Cardiomegaly  Noted on CT - check echo.  - Echocardiogram Complete; Future    Essential hypertension  BP above goal < 140/90 today,  but previously had been normal. Come back in 2-4 w for RN BP check. If still elevated, would recommend treatment.    Cortical age-related cataract of both eyes  Declines referral for this today.    Mild intermittent asthma without complication  Symtpoms well controlled with prn albuterol.    Mixed hyperlipidemia  ASCVD score 22% - would recommend repeating fasting lipids and then discussing whether she would want to start a statin in future.    Pancreatic insufficiency  [place] losing weight on [place] to [place]. Had CT that showed pancreatic duct calculus causing atrophy of pancreas, likely leading to pancreatic insufficiency. Recommendation was to see GI and do ERCP. She has been gaining weight now, has a good appetite, and denies abdominal pain or diarrhea. She does not wish to see GI.        Patient has been advised of split billing requirements and indicates understanding: Yes        Counseling  Appropriate preventive services were addressed with this patient via screening, questionnaire, or discussion as appropriate for fall prevention, nutrition, physical activity, Tobacco-use cessation, social engagement, weight loss and cognition.  Checklist reviewing preventive services available has been given to the patient.  Reviewed patient's diet, addressing concerns and/or questions.   She is at risk for lack of exercise and has been provided with information to increase physical activity for the benefit of her well-being.   Patient is at risk for social isolation and has been provided with information about the benefit of social connection.   The patient was instructed to see the dentist every 6 months.       Follow up 4mos for HTN, asthma, and other medical problems.    The longitudinal plan of care for the diagnosis(es)/condition(s) as documented were addressed during this visit. Due to the added complexity in care, I will continue to support [facility] in the subsequent management and with ongoing continuity of  care.    Subjective   Marciano is a 75 year old, presenting for the following:  Physical        2/11/2025     2:04 PM   Additional Questions   Roomed by MATTHEW Price   Accompanied by Daughter: Ekta Carreon and grandson : Howard MOORE    Has good appetite, no pain when eats  Gaining weight, does not feel that she needs to see a stomach specialist    Pain in left breast  - started yesterday, present whole time  - points to spot in upper inner quadrant of left breast  - nothing makes it worse - movement, exertion  - not sure what to take for it so not sure what will make it better    Took vit D before, not now  Inhaler uses as needed when coughs    Fell once int he snow - little fall, walking into adult day care, was snowing, tripped and fell. No other falls. Just uses a stick to help her walk  Wondering about cane or walker    Stopped smoking    Would like hearing aids    Feels like vision is okay            Health Care Directive  Patient does not have a Health Care Directive: not discussed      2/11/2025   General Health   How would you rate your overall physical health? Good   Feel stress (tense, anxious, or unable to sleep) Not at all         2/11/2025   Nutrition   Diet: Low fat/cholesterol         2/11/2025   Exercise   Days per week of moderate/strenous exercise 3 days         2/11/2025   Social Factors   Frequency of gathering with friends or relatives Never   Worry food won't last until get money to buy more No   Food not last or not have enough money for food? No   Do you have housing? (Housing is defined as stable permanent housing and does not include staying ouside in a car, in a tent, in an abandoned building, in an overnight shelter, or couch-surfing.) No   Are you worried about losing your housing? No   Lack of transportation? No   Unable to get utilities (heat,electricity)? No   Want help with housing or utility concern? No   (!) HOUSING CONCERN PRESENT(!) SOCIAL CONNECTIONS CONCERN      2/11/2025    Fall Risk   Fallen 2 or more times in the past year? Yes   Trouble with walking or balance? No   Gait Speed Test (Document in seconds) 5.44   Gait Speed Test Interpretation Greater than 5.01 seconds - ABNORMAL          2025   Activities of Daily Living- Home Safety   Needs help with the following daily activites None of the above   Safety concerns in the home None of the above         2025   Dental   Dentist two times every year? (!) NO         2025   Hearing Screening   Hearing concerns? None of the above         2025   Driving Risk Screening   Patient/family members have concerns about driving No         2025   General Alertness/Fatigue Screening   Have you been more tired than usual lately? No         2025   Urinary Incontinence Screening   Bothered by leaking urine in past 6 months No            Today's PHQ-2 Score:       2025     1:52 PM   PHQ-2 (  [employer])   PHQ-2 Score Incomplete           2025   Substance Use   Alcohol more than 3/day or more than 7/wk Not Applicable   Do you have a current opioid prescription? No   How severe/bad is pain from 1 to 10? 0/10 (No Pain)   Do you use any other substances recreationally? No     Social History     Tobacco Use    Smoking status: Former     Types: Pipe     Start date:      Quit date:      Years since quittin.1     Passive exposure: Current    Smokeless tobacco: Never    Tobacco comments:     Grandsons smoke outside    Vaping Use    Vaping status: Never Used   Substance Use Topics    Alcohol use: Never    Drug use: Never          Mammogram Screening - After age 74- determine frequency with patient based on health status, life expectancy and patient goals    ASCVD Risk   The 10-year ASCVD risk score (Iron GAMEZ, et al., 2019) is: 21.6%    Values used to calculate the score:      Age: 75 years      Sex: Female      Is Non- : No      Diabetic: No      Tobacco smoker: No       "Systolic Blood Pressure: 144 mmHg      Is BP treated: No      HDL Cholesterol: 43 mg/dL      Total Cholesterol: 197 mg/dL            Reviewed and updated as needed this visit by Provider   Tobacco  Allergies  Meds  Problems  Med Hx  Surg Hx  Fam Hx              Current providers sharing in care for this patient include:  Patient Care Team:  Maia Walton MD as PCP - General (Family Medicine)  Megan Hooks MD as Assigned PCP  Carmen Champagne RN as Lead Care Coordinator (Primary Care - CC)  Lela Galarza AuD as Audiologist (Audiology)    The following health maintenance items are reviewed in Epic and correct as of today:  Health Maintenance   Topic Date Due    DEXA  Never done    ASTHMA ACTION PLAN  Never done    ADVANCE CARE PLANNING  Never done    ZOSTER IMMUNIZATION (1 of 2) Never done    COVID-19 Vaccine (1 - 2024-25 season) Never done    BMP  07/25/2025    LUNG CANCER SCREENING  08/02/2025    ASTHMA CONTROL TEST  08/11/2025    ANNUAL REVIEW OF HM ORDERS  08/21/2025    MEDICARE ANNUAL WELLNESS VISIT  02/11/2026    FALL RISK ASSESSMENT  02/11/2026    GLUCOSE  07/25/2027    LIPID  07/25/2029    DTAP/TDAP/TD IMMUNIZATION (2 - Td or Tdap) 08/21/2034    HEPATITIS C SCREENING  Completed    PHQ-2 (once per calendar year)  Completed    INFLUENZA VACCINE  Completed    Pneumococcal Vaccine: 50+ Years  Completed    RSV VACCINE  Completed    HPV IMMUNIZATION  Aged Out    MENINGITIS IMMUNIZATION  Aged Out         Review of Systems  Constitutional, HEENT, cardiovascular, pulmonary, gi and gu systems are negative, except as otherwise noted.     Objective    Exam  BP (!) 144/80   Pulse 70   Temp 98.2  F (36.8  C) (Oral)   Resp 16   Ht 1.359 m (4' 5.5\")   Wt 38.2 kg (84 lb 4 oz)   SpO2 97%   BMI 20.69 kg/m     Estimated body mass index is 20.69 kg/m  as calculated from the following:    Height as of this encounter: 1.359 m (4' 5.5\").    Weight as of this encounter: 38.2 kg (84 lb 4 " oz).  Wt Readings from Last 3 Encounters:   02/11/25 38.2 kg (84 lb 4 oz)   10/21/24 35.4 kg (78 lb)   08/21/24 33.1 kg (73 lb 1.3 oz)     BP Readings from Last 3 Encounters:   02/11/25 (!) 144/80   10/21/24 128/81   08/21/24 103/66         Physical Exam  GENERAL: alert and no distress  EYES: Eyes grossly normal to inspection and conjunctiva/corneas- cataract bilateral  HENT: normal cephalic/atraumatic, nose and mouth without ulcers or lesions, oropharynx clear, oral mucous membranes moist, and bilateral Tms obstructed by wax  NECK: no adenopathy, no asymmetry, masses, or scars  RESP: lungs clear to auscultation - no rales, rhonchi or wheezes  BREAST: right breast normal without masses, tenderness or nipple discharge, left breast with tenderness in lower inner quadrant with question of palpable mobile 2cm mass   CV: regular rate and rhythm, normal S1 S2, no S3 or S4, no murmur, click or rub, no peripheral edema  ABDOMEN: soft, nontender, no hepatosplenomegaly, no masses and bowel sounds normal  MS: no gross musculoskeletal defects noted, no edema  SKIN: no suspicious lesions or rashes  NEURO: Normal strength and tone, mentation intact and speech normal  PSYCH: mentation appears normal, affect normal/bright        2/11/2025   Mini Cog   Clock Draw Score 0 Abnormal   3 Item Recall 3 objects recalled   Mini Cog Total Score 3         Prior to immunization administration, verified patients identity using patient s name and date of birth. Please see Immunization Activity for additional information.     Screening Questionnaire for Adult Immunization    Are you sick today?   No   Do you have allergies to medications, food, a vaccine component or latex?   No   Have you ever had a serious reaction after receiving a vaccination?   No   Do you have a long-term health problem with heart, lung, kidney, or metabolic disease (e.g., diabetes), asthma, a blood disorder, no spleen, complement component deficiency, a cochlear implant,  or a spinal fluid leak?  Are you on long-term aspirin therapy?   No   Do you have cancer, leukemia, HIV/AIDS, or any other immune system problem?   No   Do you have a parent, brother, or sister with an immune system problem?   No   In the past 3 months, have you taken medications that affect  your immune system, such as prednisone, other steroids, or anticancer drugs; drugs for the treatment of rheumatoid arthritis, Crohn s disease, or psoriasis; or have you had radiation treatments?   No   Have you had a seizure, or a brain or other nervous system problem?   No   During the past year, have you received a transfusion of blood or blood    products, or been given immune (gamma) globulin or antiviral drug?   No   For women: Are you pregnant or is there a chance you could become       pregnant during the next month?   No   Have you received any vaccinations in the past 4 weeks?   No     Immunization questionnaire answers were all negative.      Patient instructed to remain in clinic for 15 minutes afterwards, and to report any adverse reactions.     Screening performed by Sai Fonseca MA on 2/11/2025 at 3:14 PM.         Signed Electronically by: Maia Walton MD

## 2025-02-11 NOTE — PATIENT INSTRUCTIONS
Patient Education   You have been provided the Preventive Care Advice document.    Additional copies can be found here: [email or web address]  Preventive Care Advice   This is general advice given by our system to help you stay healthy. However, your care team may have specific advice just for you. Please talk to your care team about your preventive care needs.  Nutrition  Eat 5 or more servings of fruits and vegetables each day.  Try wheat bread, brown rice and whole grain pasta (instead of white bread, rice, and pasta).  Get enough calcium and vitamin D. Check the label on foods and aim for 100% of the RDA (recommended daily allowance).  Lifestyle  Exercise at least 150 minutes each week  (30 minutes a day, 5 days a week).  Do muscle strengthening activities 2 days a week. These help control your weight and prevent disease.  No smoking.  Wear sunscreen to prevent skin cancer.  Have a dental exam and cleaning every 6 months.  Yearly exams  See your health care team every year to talk about:  Any changes in your health.  Any medicines your care team has prescribed.  Preventive care, family planning, and ways to prevent chronic diseases.  Shots (vaccines)   HPV shots (up to age 26), if you've never had them before.  Hepatitis B shots (up to age 59), if you've never had them before.  COVID-19 shot: Get this shot when it's due.  Flu shot: Get a flu shot every year.  Tetanus shot: Get a tetanus shot every 10 years.  Pneumococcal, hepatitis A, and RSV shots: Ask your care team if you need these based on your risk.  Shingles shot (for age 50 and up)  General health tests  Diabetes screening:  Starting at age 35, Get screened for diabetes at least every 3 years.  If you are younger than age 35, ask your care team if you should be screened for diabetes.  Cholesterol test: At age 39, start having a cholesterol test every 5 years, or more often if advised.  Bone density scan (DEXA): At age 50, ask your care team if you  should have this scan for osteoporosis (brittle bones).  Hepatitis C: Get tested at least once in your life.  STIs (sexually transmitted infections)  Before age 24: Ask your care team if you should be screened for STIs.  After age 24: Get screened for STIs if you're at risk. You are at risk for STIs (including HIV) if:  You are sexually active with more than one person.  You don't use condoms every time.  You or a partner was diagnosed with a sexually transmitted infection.  If you are at risk for HIV, ask about PrEP medicine to prevent HIV.  Get tested for HIV at least once in your life, whether you are at risk for HIV or not.  Cancer screening tests  Cervical cancer screening: If you have a cervix, begin getting regular cervical cancer screening tests starting at age 21.  Breast cancer scan (mammogram): If you've ever had breasts, begin having regular mammograms starting at age 40. This is a scan to check for breast cancer.  Colon cancer screening: It is important to start screening for colon cancer at age 45.  Have a colonoscopy test every 10 years (or more often if you're at risk) Or, ask your provider about stool tests like a FIT test every year or Cologuard test every 3 years.  To learn more about your testing options, visit:   .  For help making a decision, visit:   https://bit.ly/kk60339.  Prostate cancer screening test: If you have a prostate, ask your care team if a prostate cancer screening test (PSA) at age 55 is right for you.  Lung cancer screening: If you are a current or former smoker ages 50 to 80, ask your care team if ongoing lung cancer screenings are right for you.  For informational purposes only. Not to replace the advice of your health care provider. Copyright   2023 [employer]. All rights reserved. Clinically reviewed by the [facility] Transitions Program. [employer] [id number] - REV 01/24.  Preventing Falls: Care Instructions  Injuries and health problems such as trouble  walking or poor eyesight can increase your risk of falling. So can some medicines. But there are things you can do to help prevent falls. You can exercise to get stronger. You can also arrange your home to make it safer.    Talk to your doctor about the medicines you take. Ask if any of them increase the risk of falls and whether they can be changed or stopped.   Try to exercise regularly. It can help improve your strength and balance. This can help lower your risk of falling.         Practice fall safety and prevention.   Wear low-heeled shoes that fit well and give your feet good support. Talk to your doctor if you have foot problems that make this hard.  Carry a cellphone or wear a medical alert device that you can use to call for help.  Use stepladders instead of chairs to reach high objects. Don't climb if you're at risk for falls. Ask for help, if needed.  Wear the correct eyeglasses, if you need them.        Make your home safer.   Remove rugs, cords, clutter, and furniture from walkways.  Keep your house well lit. Use night-lights in hallways and bathrooms.  Install and use sturdy handrails on stairways.  Wear nonskid footwear, even inside. Don't walk barefoot or in socks without shoes.        Be safe outside.   Use handrails, curb cuts, and ramps whenever possible.  Keep your hands free by using a shoulder bag or backpack.  Try to walk in well-lit areas. Watch out for uneven ground, changes in pavement, and debris.  Be careful in the winter. Walk on the grass or gravel when sidewalks are slippery. Use de-icer on steps and walkways. Add non-slip devices to shoes.    Put grab bars and nonskid mats in your shower or tub and near the toilet. Try to use a shower chair or bath bench when bathing.   Get into a tub or shower by putting in your weaker leg first. Get out with your strong side first. Have a phone or medical alert device in the bathroom with you.   Where can you learn more?  Go to  "https://www.[employer].net/patiented  Enter G117 in the search box to learn more about \"Preventing Falls: Care Instructions.\"  Current as of: July 31, 2024  Content Version: 14.3    2024 [employer].   Care instructions adapted under license by your healthcare professional. If you have questions about a medical condition or this instruction, always ask your healthcare professional. [employer] disclaims any warranty or liability for your use of this information.       "

## 2025-02-12 LAB
FERRITIN SERPL-MCNC: 45 NG/ML (ref 11–328)
VIT D+METAB SERPL-MCNC: 10 NG/ML (ref 20–50)

## 2025-02-13 ENCOUNTER — OFFICE VISIT (OUTPATIENT)
Dept: OTOLARYNGOLOGY | Facility: CLINIC | Age: 75
End: 2025-02-13
Payer: COMMERCIAL

## 2025-02-13 ENCOUNTER — OFFICE VISIT (OUTPATIENT)
Dept: AUDIOLOGY | Facility: CLINIC | Age: 75
End: 2025-02-13
Attending: FAMILY MEDICINE
Payer: COMMERCIAL

## 2025-02-13 DIAGNOSIS — H90.6 MIXED CONDUCTIVE AND SENSORINEURAL HEARING LOSS OF BOTH EARS: ICD-10-CM

## 2025-02-13 DIAGNOSIS — E55.9 VITAMIN D DEFICIENCY: Primary | ICD-10-CM

## 2025-02-13 DIAGNOSIS — H91.92 HEARING LOSS OF LEFT EAR, UNSPECIFIED HEARING LOSS TYPE: ICD-10-CM

## 2025-02-13 DIAGNOSIS — E53.8 FOLIC ACID DEFICIENCY: ICD-10-CM

## 2025-02-13 DIAGNOSIS — H61.23 BILATERAL IMPACTED CERUMEN: Primary | ICD-10-CM

## 2025-02-13 RX ORDER — FOLIC ACID 1 MG/1
1 TABLET ORAL DAILY
Qty: 90 TABLET | Refills: 1 | Status: SHIPPED | OUTPATIENT
Start: 2025-02-13

## 2025-02-13 RX ORDER — ERGOCALCIFEROL 1.25 MG/1
50000 CAPSULE, LIQUID FILLED ORAL WEEKLY
Qty: 12 CAPSULE | Refills: 1 | Status: SHIPPED | OUTPATIENT
Start: 2025-02-13

## 2025-02-13 NOTE — PROGRESS NOTES
Assessment & Plan     1Y follow-up     Problem List Items Addressed This Visit    None  Visit Diagnoses       Bilateral impacted cerumen    -  Primary    Relevant Orders    REMOVE IMPACTED CERUMEN (Completed)    Hearing loss of left ear, unspecified hearing loss type                  4 minutes spent on the date of the encounter doing chart review, history and exam, documentation and further activities per the note  {     HILDA Elias  [facility]    Subjective     HPI   Patient here today for cerumen disimpaction in anticipation of audio.  Last cleaned in approx 2023 in [place].   The patient reports significant hearing changes,on LEFT, not the side with the wax ( right)    No history of ear surgery or chronic ear disease.     Interpretor present throughout visit.           Review of Systems   EARs as above      Objective    There were no vitals taken for this visit.      Physical Exam and Procedure  Verbal Consent Received  Ears - On examination of the ears, I found that the  BOTH sides had cerumen impaction- RIGHT was obstructive.  Therefore, I positioned them in the examination chair in a  supine position.  I used the binocular surgical microscope to perform cerumen removal w/instrumentation .  The tympanic membranes were intact, no sign of perforation or middle ear effusion.    Patient tolerated well.      Constitutional:   The patient was in no acute distress.      Head/Face:   Normocephalic and atraumatic.  No lesions or scars.     E     Nose:  Anterior rhinoscopy revealed midline septum and absence of purulence or polyps.      Mouth:  Normal tongue, floor of mouth, buccal mucosa, and palate.  No lesions, ulceration or  masses on inspection, normal voice quality      Oropharynx:  Normal mucosa, palate symmetric with normal elevation. Tonsils  not visualized       Neck:  Supple with normal laryngeal and tracheal landmarks. No palpable thyroid.     Lymphatic:  There is no  palpable lymphadenopathy in the neck.

## 2025-02-13 NOTE — RESULT ENCOUNTER NOTE
RN team - please call patient with results. Blood counts are better. Her folic acid level is a little low so I recommend taking a folic acid supplement every day - sent to pharmacy. Her vitamin D level is also low. I would like her to take a weekly high dose vitamin D supplement until I see her again in June then we will recheck the levels.

## 2025-02-13 NOTE — LETTER
2/13/2025      [facility]  [address] [place] [address]      Dear Colleague,    Thank you for referring your patient, Marciano Story, to the [facility]. Please see a copy of my visit note below.    Assessment & Plan    1Y follow-up     Problem List Items Addressed This Visit    None  Visit Diagnoses       Bilateral impacted cerumen    -  Primary    Relevant Orders    REMOVE IMPACTED CERUMEN (Completed)    Hearing loss of left ear, unspecified hearing loss type                  4 minutes spent on the date of the encounter doing chart review, history and exam, documentation and further activities per the note  {     HILDA Elias  [facility]    Subjective     HPI   Patient here today for cerumen disimpaction in anticipation of audio.  Last cleaned in approx 2023 in [facility].   The patient reports significant hearing changes,on LEFT, not the side with the wax ( right)    No history of ear surgery or chronic ear disease.     Interpretor present throughout visit.           Review of Systems   EARs as above      Objective    There were no vitals taken for this visit.      Physical Exam and Procedure  Verbal Consent Received  Ears - On examination of the ears, I found that the  BOTH sides had cerumen impaction- RIGHT was obstructive.  Therefore, I positioned them in the examination chair in a  supine position.  I used the binocular surgical microscope to perform cerumen removal w/instrumentation .  The tympanic membranes were intact, no sign of perforation or middle ear effusion.    Patient tolerated well.      Constitutional:   The patient was in no acute distress.      Head/Face:   Normocephalic and atraumatic.  No lesions or scars.     E     Nose:  Anterior rhinoscopy revealed midline septum and absence of purulence or polyps.      Mouth:  Normal tongue, floor of mouth, buccal mucosa, and palate.  No lesions, ulceration or  masses on inspection, normal voice  quality      Oropharynx:  Normal mucosa, palate symmetric with normal elevation. Tonsils  not visualized       Neck:  Supple with normal laryngeal and tracheal landmarks. No palpable thyroid.     Lymphatic:  There is no palpable lymphadenopathy in the neck.                         Again, thank you for allowing me to participate in the care of your patient.        Sincerely,        HILDA Elias    Electronically signed

## 2025-02-15 LAB — SCHISTOSOMA IGG SER IA-ACNC: 10 U

## 2025-02-17 ENCOUNTER — TELEPHONE (OUTPATIENT)
Dept: OTOLARYNGOLOGY | Facility: CLINIC | Age: 75
End: 2025-02-17
Payer: COMMERCIAL

## 2025-02-17 DIAGNOSIS — H69.91 ETD (EUSTACHIAN TUBE DYSFUNCTION), RIGHT: ICD-10-CM

## 2025-02-17 DIAGNOSIS — H90.A31 MIXED CONDUCTIVE AND SENSORINEURAL HEARING LOSS OF RIGHT EAR WITH RESTRICTED HEARING OF LEFT EAR: Primary | ICD-10-CM

## 2025-02-17 NOTE — TELEPHONE ENCOUNTER
Patient medically cleared for hearing aids.  I have also ordered a CT of her temporal bones to further evaluate the right-sided conductive hearing loss.  Please ask patient if she has recurrent runny nose postnasal drip or other nasal or sinus symptoms?  She should also start an over the counter nasal saline mister.    Garland Chahal PA-C

## 2025-02-18 NOTE — TELEPHONE ENCOUNTER
Pt's granddaughter Logan Pineda (consent to communicate on file) notified via Lula  and verbalized understanding of Garland' message. She was transferred to the scheduling line to set up hearing aid appointments with [facility] audiologist. CT is scheduled for next week. She states that pt does not have any PND, runny nose, or sinus symptoms.   Tyesha Conte RN on 2/18/2025 at 1:39 PM

## 2025-02-19 ENCOUNTER — ANCILLARY PROCEDURE (OUTPATIENT)
Dept: MAMMOGRAPHY | Facility: CLINIC | Age: 75
End: 2025-02-19
Attending: FAMILY MEDICINE
Payer: COMMERCIAL

## 2025-02-19 DIAGNOSIS — N63.24 MASS OF LOWER INNER QUADRANT OF LEFT BREAST: ICD-10-CM

## 2025-02-19 DIAGNOSIS — N64.4 BREAST PAIN, LEFT: ICD-10-CM

## 2025-02-19 PROCEDURE — 77066 DX MAMMO INCL CAD BI: CPT

## 2025-02-19 PROCEDURE — 76642 ULTRASOUND BREAST LIMITED: CPT | Mod: LT

## 2025-02-21 ENCOUNTER — HOSPITAL ENCOUNTER (OUTPATIENT)
Dept: BONE DENSITY | Facility: HOSPITAL | Age: 75
Discharge: HOME | End: 2025-02-21
Attending: FAMILY MEDICINE | Admitting: FAMILY MEDICINE
Payer: COMMERCIAL

## 2025-02-21 DIAGNOSIS — E55.9 VITAMIN D DEFICIENCY: ICD-10-CM

## 2025-02-21 PROCEDURE — 77080 DXA BONE DENSITY AXIAL: CPT

## 2025-02-21 PROCEDURE — 77091 TBS TECHL CALCULATION ONLY: CPT

## 2025-02-25 ENCOUNTER — HOSPITAL ENCOUNTER (OUTPATIENT)
Dept: CT IMAGING | Facility: HOSPITAL | Age: 75
Discharge: HOME | End: 2025-02-25
Attending: PHYSICIAN ASSISTANT
Payer: COMMERCIAL

## 2025-02-25 DIAGNOSIS — H90.A31 MIXED CONDUCTIVE AND SENSORINEURAL HEARING LOSS OF RIGHT EAR WITH RESTRICTED HEARING OF LEFT EAR: ICD-10-CM

## 2025-02-25 PROCEDURE — 70480 CT ORBIT/EAR/FOSSA W/O DYE: CPT

## 2025-02-27 ENCOUNTER — TELEPHONE (OUTPATIENT)
Dept: OTOLARYNGOLOGY | Facility: CLINIC | Age: 75
End: 2025-02-27
Payer: COMMERCIAL

## 2025-02-27 ENCOUNTER — TELEPHONE (OUTPATIENT)
Dept: FAMILY MEDICINE | Facility: CLINIC | Age: 75
End: 2025-02-27
Payer: COMMERCIAL

## 2025-02-27 DIAGNOSIS — H69.91 ETD (EUSTACHIAN TUBE DYSFUNCTION), RIGHT: ICD-10-CM

## 2025-02-27 DIAGNOSIS — H90.A31 MIXED CONDUCTIVE AND SENSORINEURAL HEARING LOSS OF RIGHT EAR WITH RESTRICTED HEARING OF LEFT EAR: ICD-10-CM

## 2025-02-27 DIAGNOSIS — M81.0 AGE-RELATED OSTEOPOROSIS WITHOUT CURRENT PATHOLOGICAL FRACTURE: Primary | ICD-10-CM

## 2025-02-27 DIAGNOSIS — J32.4 CHRONIC PANSINUSITIS: Primary | ICD-10-CM

## 2025-02-27 RX ORDER — FLUNISOLIDE 0.25 MG/ML
2 SOLUTION NASAL EVERY 12 HOURS
Qty: 25 ML | Refills: 11 | Status: SHIPPED | OUTPATIENT
Start: 2025-02-27

## 2025-02-27 RX ORDER — ALENDRONATE SODIUM 70 MG/1
70 TABLET ORAL
Qty: 12 TABLET | Refills: 3 | Status: SHIPPED | OUTPATIENT
Start: 2025-02-27

## 2025-02-27 NOTE — TELEPHONE ENCOUNTER
I will send prescription for alendronate to the pharmacy for osteoporosis. She should take this as follows:  Take once every 7 days. Take the medication on an empty stomach, first thing in the morning with at least 8 ounces of water--do not take with other drinks. Remain upright (do not lie down) and do not eat or take anything else by mouth (including other pills) until at least 30 minutes after taking the medication.  Prescription sent.

## 2025-02-27 NOTE — TELEPHONE ENCOUNTER
Called patient and left message to call clinic back. Please relay message below if patient calls back.      SHANTE Peck CemN RN  [facility]

## 2025-02-27 NOTE — RESULT ENCOUNTER NOTE
RN team - please call patient with results. Bone density test shows that she has osteoporosis, or weak bones, and is at risk of getting a broken bone from falling. I recommend treating this with medication - there is a pill she could take one a week to help strengthen the bones. Would she want to start this medication? Please let me know

## 2025-02-27 NOTE — TELEPHONE ENCOUNTER
Please contact patient.  Her temporal bone CT scan showed extensive sinus disease which   per previous telephone call appears largely asymptomatic.  Though it is possible that drainage from the sinuses could be contributing to her eustachian tube dysfunction and hearing loss on the right side.  In addition to the nasal saline mist  we discussed her starting at bedtime at the previous call,  she should also start nasal steroid spray which I will send over to the pharmacy.  I would like her to try that for 3 months and please schedule appointment with me for follow-up at the end of May.    Garland Chahal PA-C

## 2025-02-27 NOTE — TELEPHONE ENCOUNTER
Spoke to granddaughter, Logan Pineda (CTC on file) with  ID: [id number] to relay provider test result and recommendation above.  Per chart reviewed, a Vitamin D2 50,000 unit sent to pharmacy in which patient picked up already.      Family agreed to a treatment as recommended.  Please send medication to pharmacy on file.     Can leave a detail message if not answer at calling back for treatment or questions.    Will route encounter to provider for an update and treatment.      Axel Frost RN  [facility]

## 2025-02-28 NOTE — TELEPHONE ENCOUNTER
2nd attempt----Called patient, unable to reach patient, left voicemail. Please relay provider message if patient calls back. Thank you.       Anatoly Cruz, MSN, RN   [facility]

## 2025-03-03 ENCOUNTER — APPOINTMENT (OUTPATIENT)
Dept: INTERPRETER SERVICES | Facility: CLINIC | Age: 75
End: 2025-03-03
Payer: COMMERCIAL

## 2025-03-03 NOTE — TELEPHONE ENCOUNTER
"Writer attempt #3 to call patient with the help of an [facility] \"Lula\"    (ID # [id number]) regarding clinician's message below. Dr. Farris message relayed granddaughter.    Family verbalizes understanding, agrees with plan and has no further questions.     Deann Edouard, BSN, RN, PHN   [facility]    "

## 2025-03-03 NOTE — TELEPHONE ENCOUNTER
Spoke to pt's grand daughter viaallan Cotton . She was notified of the message and verbalized understanding. Follow up appointment scheduled for end of may.   Tyesha Conte RN on 3/3/2025 at 4:22 PM

## 2025-03-04 ENCOUNTER — ALLIED HEALTH/NURSE VISIT (OUTPATIENT)
Dept: FAMILY MEDICINE | Facility: CLINIC | Age: 75
End: 2025-03-04
Payer: COMMERCIAL

## 2025-03-04 VITALS — DIASTOLIC BLOOD PRESSURE: 62 MMHG | SYSTOLIC BLOOD PRESSURE: 128 MMHG

## 2025-03-04 DIAGNOSIS — I10 ESSENTIAL HYPERTENSION: Primary | ICD-10-CM

## 2025-03-04 PROCEDURE — 99207 PR NO CHARGE NURSE ONLY: CPT

## 2025-03-04 PROCEDURE — 3074F SYST BP LT 130 MM HG: CPT

## 2025-03-04 PROCEDURE — 3078F DIAST BP <80 MM HG: CPT

## 2025-03-04 NOTE — PROGRESS NOTES
I met with [facility] at the request of Dr. Farris  to recheck her blood pressure.  Blood pressure medications on the med list were reviewed with patient.    Patient has taken all medications as per usual regimen: Yes  Patient reports tolerating them without any issues or concerns: Yes    Vitals:    03/04/25 0846   BP: 128/62       Blood pressure was taken, previous encounter was reviewed, recorded blood pressure below 140/90.  Patient was discharged and the note will be sent to the provider for final review.      Axel Frost RN  [facility]

## 2025-03-11 ENCOUNTER — PATIENT OUTREACH (OUTPATIENT)
Dept: GERIATRIC MEDICINE | Facility: CLINIC | Age: 75
End: 2025-03-11
Payer: COMMERCIAL

## 2025-03-11 NOTE — PROGRESS NOTES
[facility] Care Coordination Contact    Received voice mail from Ap [phone] from [facility] requesting update on CFSS auth.  Left voice mail with Ap explaining new process per DHS:  member is new to CFSS and would need to wait until consultation services is completed.  Referral sent to [facility] for consultation services back in January 2025 and it may take up to 6 months before this is completed . Unfortunately there is no way for writer to speed up process.     Carmen Champagne RN PHN  [facility]  Care Coordinator  [phone]

## 2025-03-18 ENCOUNTER — TELEPHONE (OUTPATIENT)
Dept: FAMILY MEDICINE | Facility: CLINIC | Age: 75
End: 2025-03-18
Payer: COMMERCIAL

## 2025-03-18 NOTE — TELEPHONE ENCOUNTER
[facility] (adult ) calling to inquire if [facility] received form they faxed on 3/11, was also brought by patient to annual visit with PCP on 2/11 with Dr. Farris. General health & medication form. They have not received completed form.     Please follow up on fax/form or call adult  back at [phone].    Thanks,  Alisha Tran RN BSN  [facility]

## 2025-03-18 NOTE — TELEPHONE ENCOUNTER
I do remember completing this form and placing into blue folder in completed folder area. I don't see it scanned in. If we cannot locate form, please get a new one from [facility] and I can luis out.

## 2025-03-18 NOTE — TELEPHONE ENCOUNTER
[facility] (adult ) calling to inquire if [facility] received form they faxed on 3/11, was also brought by patient to annual visit with PCP on 2/11 with Dr. Farris. General health & medication form. They have not received completed form.      Please follow up on fax/form or call adult  back at [phone].     Thanks,  Alisha Tran RN BSN  [facility]

## 2025-03-24 ENCOUNTER — OFFICE VISIT (OUTPATIENT)
Dept: AUDIOLOGY | Facility: CLINIC | Age: 75
End: 2025-03-24
Payer: COMMERCIAL

## 2025-03-24 DIAGNOSIS — H90.6 MIXED CONDUCTIVE AND SENSORINEURAL HEARING LOSS OF BOTH EARS: Primary | ICD-10-CM

## 2025-03-24 PROCEDURE — 92591 PR HEARING AID EXAM BINAURAL: CPT | Performed by: AUDIOLOGIST

## 2025-03-24 PROCEDURE — T1013 SIGN LANG/ORAL INTERPRETER: HCPCS | Mod: U3

## 2025-03-25 ENCOUNTER — PATIENT OUTREACH (OUTPATIENT)
Dept: GERIATRIC MEDICINE | Facility: CLINIC | Age: 75
End: 2025-03-25
Payer: COMMERCIAL

## 2025-03-25 NOTE — PROGRESS NOTES
[facility] Care Coordination Contact    Email sent to [facility] requesting status update on EW U code removal sent 1/29/25.  Currently at day 56. Phone call with Maggy from [facility] [phone] and provided update.     Carmen Champagne RN PHN  [facility]  Care Coordinator  [phone]  3/25/25    [facility] Care Coordination Contact    Care coordinator received email from [facility] stating U Code removed.  EW MMIS entry completed.   Phone call with Maggy and member would like to attend 3x/day with 6 one-way rides to center.  Member has not attend center this week.   Care coordinator reviewed budget and [facility] has enough funds for 3x/week with transportation. Maggy will connect with member and relay approval of EW and discuss which days of week she would like to attend.  Support plan updated with new services effective 3/31/25-12/31/25 3x/week of adult day care with 6 one-way trips.   Tasked cms to auth.     Carmen Champagne RN PHN  [facility]  Care Coordinator  [phone]  3/26/25

## 2025-03-26 NOTE — PROGRESS NOTES
AUDIOLOGY REPORT    SUBJECTIVE: Marciano Story is a 75 year old female was seen in the Audiology Clinic at  [facility] on 3/24/25 to discuss concerns with hearing and functional communication difficulties. The patient was accompanied by their  and family member.  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   Marciano notes difficulty with communication in a variety of listening situations.    OBJECTIVE:    Patient is a hearing aid candidate. Patient would like to move forward with a hearing aid evaluation today. Therefore, the patient was presented with different options for amplification to help aid in communication. Discussed styles, levels of technology and monaural vs. binaural fitting.     The hearing aid(s) mutually chosen were:  Binaural: Phonak Audeo I-70-R  COLOR: silver grey  BATTERY SIZE: rechargeable  EARMOLD/TIPS: to be chosen at fitting  CANAL/ LENGTH: 1M right and left      ASSESSMENT:     Reviewed purchase information and warranty information with patient. The 45 day trial period was explained to patient. The patient was given a copy of the [place] [employer] of [employer] consumer brochure on purchasing hearing instruments. Patient risk factors have been provided to the patient in writing prior to the sale of the hearing aid per FDA regulation. The risk factors are also available in the User Instructional Booklet to be presented on the day of the hearing aid fitting. Hearing aid(s) ordered. Hearing aid evaluation completed.    PLAN: Marciano is scheduled to return on 5/5/2025 for a hearing aid fitting and programming. Purchase agreement will be completed on that date. Please contact this clinic with any questions or concerns.    Magdy Jones, CCC-A  [place] Licensed Audiologist #[id number]

## 2025-03-27 ENCOUNTER — PATIENT OUTREACH (OUTPATIENT)
Dept: GERIATRIC MEDICINE | Facility: CLINIC | Age: 75
End: 2025-03-27
Payer: COMMERCIAL

## 2025-03-27 NOTE — PROGRESS NOTES
[facility] Care Coordination Contact    Received after visit chart from care coordinator.  Completed following tasks: Submitted referrals/auths for ADC with transportation service to are    Member Signature - Support Plan Change:  Per CC, member has made a change to their support plan.  Care Plan Change Letter mailed to member for signature with a self-addressed return envelope.    Kellen Cabrales  Care Management Specialist  [facility]  [phone]            Name band;

## 2025-03-31 ENCOUNTER — PATIENT OUTREACH (OUTPATIENT)
Dept: GERIATRIC MEDICINE | Facility: CLINIC | Age: 75
End: 2025-03-31
Payer: COMMERCIAL

## 2025-03-31 NOTE — LETTER
March 31, 2025    [facility]  [address] [place] [address]      Dear Marciano:    As a member of [facility] ([facility] you are provided a care coordinator. I will be your new care coordinator as of 4/1/2025. I will be calling you soon to see how you are doing and determine your needs.    If you have any questions, please feel free to call me at [phone]. If you reach my voice mail, please leave a message and your phone number. If you are hearing impaired, please call the [place] Relay at 276 or [phone] ([email or web address] relay service).    I look forward to speaking with you soon.    Sincerely,        RAMU Emmanuel   [phone]  [email or web address]          [facility]  [id number] DHS Approved ([id number])  [id number] (11/18)

## 2025-03-31 NOTE — PROGRESS NOTES
[facility] Care Coordination Contact    Internal CC change effective 4/1/2025.  Mailed member CC Change letter.  Additional tasks to be completed by CMS include: update database & EPIC, enter CC Change in MMIS, and move member file.    Lula Cuellar  Case Management Specialist   [facility]  [phone]

## 2025-04-08 ENCOUNTER — PATIENT OUTREACH (OUTPATIENT)
Dept: GERIATRIC MEDICINE | Facility: CLINIC | Age: 75
End: 2025-04-08
Payer: COMMERCIAL

## 2025-04-08 NOTE — PROGRESS NOTES
Care coordinator called granddaughter back for member on questions regarding SA for PCA service.  Member was assessed back in January and waiver span starts in March but new PCA services have not start yet due to CS not completed yet.  Care coordinator explained this and family understand.  PCA hours are at 4 hours/day and this is pending.    RAMU Emmanuel  [facility]  [phone]

## 2025-04-10 ENCOUNTER — PATIENT OUTREACH (OUTPATIENT)
Dept: GERIATRIC MEDICINE | Facility: CLINIC | Age: 75
End: 2025-04-10
Payer: COMMERCIAL

## 2025-04-10 NOTE — PROGRESS NOTES
Granddaughter called care coordinator that the PCA provider listed is wrong and not [facility].  Care coordinator noted that since this is initiate PCA, then services can't start until CS approves the plan.  PCA provider is just going to have to wait as well at this stage.  Family noted that [facility] called them to go over the PCA service plan and needs for member to sign the plan.    RAMU Emmanuel  [facility]  [phone]

## 2025-05-05 ENCOUNTER — OFFICE VISIT (OUTPATIENT)
Dept: AUDIOLOGY | Facility: CLINIC | Age: 75
End: 2025-05-05
Payer: COMMERCIAL

## 2025-05-05 DIAGNOSIS — H90.6 MIXED CONDUCTIVE AND SENSORINEURAL HEARING LOSS OF BOTH EARS: Primary | ICD-10-CM

## 2025-05-05 PROCEDURE — V5261 HEARING AID, DIGIT, BIN, BTE: HCPCS | Mod: NU | Performed by: AUDIOLOGIST

## 2025-05-05 PROCEDURE — T1013 SIGN LANG/ORAL INTERPRETER: HCPCS | Mod: U3

## 2025-05-05 PROCEDURE — V5160 DISPENSING FEE BINAURAL: HCPCS | Performed by: AUDIOLOGIST

## 2025-05-05 PROCEDURE — V5020 CONFORMITY EVALUATION: HCPCS | Mod: LT | Performed by: AUDIOLOGIST

## 2025-05-05 PROCEDURE — V5020 CONFORMITY EVALUATION: HCPCS | Mod: RT | Performed by: AUDIOLOGIST

## 2025-05-05 PROCEDURE — V5011 HEARING AID FITTING/CHECKING: HCPCS | Performed by: AUDIOLOGIST

## 2025-05-05 NOTE — PROGRESS NOTES
AUDIOLOGY REPORT - HEARING AID FITTING    SUBJECTIVE: Marciano Story, a 75 year old female, was seen in the Audiology Clinic at [facility] today for a Binaural hearing aid fitting. The patient was accompanied by their  and family member.  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 The patient was given medical clearance to pursue amplification by HILDA Brito on 2/17/2025.    OBJECTIVE:  Prior to fitting, a hearing aid check was performed to ensure device functionality. The hearing aid conformity evaluation was completed.The hearing aids were placed and they provided a good fit. Real-ear-probe-microphone measurements were completed on the [employer] system and were a good match to NAL-NL2 target with soft sounds audible, moderate sounds comfortable, and loud sounds below discomfort. UCLs are verified through maximum power output measures and demonstrate appropriate limiting of loud inputs. Ms. Story was oriented to proper hearing aid use, care, cleaning (no water, dry brush), batteries (size rechargeable, insertion/removal, toxicity, low-battery signal), aid insertion/removal, user booklet, warranty information, storage cases, and other hearing aid details. The patient confirmed understanding of hearing aid use and care, and showed proper insertion of hearing aid and batteries while in the office today. Ms. Story reported good volume and sound quality today.    She is set at 90% of targets today and given a volume control.  Binaural earmold impressions were taken without incident today and will make her custom cShell earmolds for both ears.  She did well today.     EAR(S) FIT: Binaural  MA HEARING AID MAKE: Right: Phonak Audeo I70-R silver gray; Left: Phonak Audeo I70-R   silver gray    MA HEARING AID MODEL #: Right: [id number];  Left: [id number]  HEARING AID STYLE: Right: FABIANA; Left: FABIANA  DOME SIZE: Right: [place] 6mm closed  ; Left::   [place] 6mm closed   LENGTH: Right:  Phonak 6.0  M1R; Left:  Phonak 6.0  M1L    SERIAL NUMBERS: Right: [id number]; Left: [id number]  WARRANTY END DATE: Right: 4/25/2028; Left:: 4/25/2028       ASSESSMENT: Binaural Phonak Audeo I70-R hearing aid fitting completed today. Verification measures were performed. The 45 day trial period was explained to patient, and they expressed understanding. Ms. Story signed the Hearing Aid Purchase Agreement and was given a copy, as well as details on her hearing aids. Patient was counseled that exact out of pocket amounts cannot be determined for hearing aid claims being sent to insurance. Any insurance coverage information presented to the patient is an estimate only, and is not a guarantee of payment. Patient has been advised to check with their own insurance.    PLAN: Ms. Story will return for follow-up in 2-3 weeks for a hearing aid review appointment. Please call this clinic with questions regarding today s appointment.    Magdy Jones, CCC-A  [place] Licensed Audiologist #[id number]

## 2025-05-05 NOTE — PATIENT INSTRUCTIONS

## 2025-05-07 DIAGNOSIS — A15.0 PULMONARY TB: Primary | ICD-10-CM

## 2025-06-03 ENCOUNTER — TELEPHONE (OUTPATIENT)
Dept: FAMILY MEDICINE | Facility: CLINIC | Age: 75
End: 2025-06-03
Payer: COMMERCIAL

## 2025-06-03 NOTE — TELEPHONE ENCOUNTER
Dr. Farris --    Roxann from [facility] is stating the following:  There are not any records indicating the patient has TB. We will need a positive Quantiferon gold and a recent chest Xray before we can schedule. Please order and have your team retreive these records.  Thank you!  DOTTIE Salter Care Coordinator  Infectious Disease Clinic    Patient has negative Quantiferon. So why does patient need to be seen at TB Clinic.     TB Clinic would like to be contacted in regards to these questions and how this is moving forward.   Please FAX: [phone].   Or call [facility]: [phone].     Thank you,  Tatiana Tolbert, BSN RN  [facility]

## 2025-06-04 NOTE — TELEPHONE ENCOUNTER
After reviewing the chart - and it being too late in the day to reach the TB clinic - I believe I was asked to place the order by the refugee coordinators - but it is not documented so I am unsure.  [place] and with unexplained weight loss, I can see a reason a screener might say the referral is needed. However, since the CT showed the pancreas stone problem, the weight loss is explained. Furthermore the chest CT does not show pulmonary TB. Therefore, I believe the referral can be cancelled.    Please call the [facility] TB office (number  below) and let them know the referral should be cancelled.

## 2025-06-04 NOTE — TELEPHONE ENCOUNTER
Dr. Hooks - it looks like you placed this referral on 5/7/25. In looking back through the chart, I don't see any evidence of positive quantgold or any need for referral to [facility] due to class B status based on Dr. Da Silva's note on 7/25/24 for refugee health exam. I just wanted to confirm with you in case I am missing something, since you placed referral. Thank you!

## 2025-06-05 NOTE — TELEPHONE ENCOUNTER
Called [facility] clinic and relayed message. They verbalized understanding and will update the information.      Mack Daley, BSN RN  [facility]

## 2025-06-09 ENCOUNTER — OFFICE VISIT (OUTPATIENT)
Dept: AUDIOLOGY | Facility: CLINIC | Age: 75
End: 2025-06-09
Payer: COMMERCIAL

## 2025-06-09 DIAGNOSIS — H90.6 MIXED CONDUCTIVE AND SENSORINEURAL HEARING LOSS OF BOTH EARS: Primary | ICD-10-CM

## 2025-06-09 PROCEDURE — V5010 ASSESSMENT FOR HEARING AID: HCPCS | Performed by: AUDIOLOGIST

## 2025-06-09 NOTE — PROGRESS NOTES
"AUDIOLOGY REPORT    SUBJECTIVE:Marciano Story is a 75 year old female who was seen in the Audiology Clinic at the [facility] on 6/9/2025  for a follow-up check regarding the fitting of new hearing aids.The patient was accompanied by their  and family member.  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 The patient was given medical clearance to pursue amplification by HILDA Brito on 2/17/2025.   The patient has been seen previously in this clinic and was fit with binaural Phonak [employer] I70-R hearing aids on 5/5/2025.      Marciano reports she wore the hearing aids for one day and they were causing pain in both ears. She hasn't worn them since.     OBJECTIVE:     Based on patient report, the following changes were made:    Fit patient with custom cShell earmolds today.  Good physical fit.   Immediately after fitting the custom earmolds, patient complained of pain in her head.  I did turn them down, but she states its a pain in the head and not in her ears.  She doesn't feel she wants hearing aids at all and states \"I don't want to come in for appointments very often\". Counseled on importance of follow up visits and also consistently wearing the hearing aids to be successful.  She would only want to wear them once in a while if she did keep them.  After much discussion, she doesn't want the hearing aids as they cause her headaches.    Hearing aids and earmold were returned for credit today. She is encouraged to reach out if she ever wants to try hearing aids again in the future.   Note: since earmolds were never taken home, they were not billed for today.       ASSESSMENT: A follow-up appointment for hearing aid fitting was completed today.  Changes to hearing aid was completed as outlined above.     PLAN:Marciano will return for follow-up " as needed.  Please call this clinic with any questions regarding today s appointment.    Magdy Jones, CCC-A  [place] Licensed Audiologist #[id number]

## 2025-06-10 NOTE — PROGRESS NOTES
Assessment & Plan     ETD (Eustachian tube dysfunction), right, and Chronic pansinusitis:  -  Not entirely clear how much sinus disease is contributing to the dysfunction. Chronic sinus disease is otherwise completely asymptomatic Patient declined surgery and two week trial low dose prednisone treatment. Continue current nasal spray treatment. Follow-up in February.    Negative pressure on right decreased compared to Feb, but symptomatically remains the same.        Problem List Items Addressed This Visit          Ear/Nose/Throat    Chronic pansinusitis    ETD (Eustachian tube dysfunction), right - Primary    Relevant Orders    TYMPANOMETRY (Completed)    NASAL ENDOSCOPY, DIAGNOSTIC (Completed)    Adult Audiology  Referral    Mixed conductive and sensorineural hearing loss of right ear with restricted hearing of left ear    Relevant Orders    Adult Audiology  Referral         27 minutes spent on the date of the encounter doing chart review, history and exam, documentation and further activities per the note  {     HILDA Elias  [facility]    Subjective     HPI-    Last Visit w/ me Feb:  Ears were cleaned and was ultimately found to have a significant right-sided conductive hearing loss w/ negative pressure on right tymp.  Temporal bone CT showed extensive sinus disease.  Patient Denied any   Overt symptoms of this.  She started nasal saline rinses, nasal steroid spray.  She has subsequently been fitted with hearing aids.       Today, reports continued hearing loss on right, has been using sprays, no pain,  again denies facial pressure, rhinitis, PND and loss of smell.         Interpretor present throughout visit.        Review of Systems   ENT as above      Objective    There were no vitals taken for this visit.    Physical Exam     Nasal Endoscopy -     The patient was counseled that their symptoms and history require a direct visualization with an  endoscopy procedure.  They understood and we proceeded with a fiberoptic examination.  First I sprayed both sides of the nose with a mixture of lidocaine and oxymetazline.  I then passed the scope through the nasal cavity.  Color photographs were taken for the permanent medical record.  The interior of the nasal cavity and the middle and superior meatus, the turbinates, and the sphenoethmoid recess was unremarkable.  The nasopharynx was mucosally covered and symmetric. The Eustachian tube openings were unobstructed.    Patient tolerated well.      Right eustachian tube small amount of mucus versus nasal spray ordered       Right middle meatus        LEFT superior nasal passage possibly a small amount of drainage        LEFT middle meatus        Ears - The tympanic membranes are normal in appearance, bony landmarks are intact.  No retraction, perforation, or masses.   No fluid or purulence was seen in the external canal or the middle ear. No evidence of infection of the middle ear or external canal, cerumen was normal in appearance.    Right tymp

## 2025-06-16 ENCOUNTER — OFFICE VISIT (OUTPATIENT)
Dept: FAMILY MEDICINE | Facility: CLINIC | Age: 75
End: 2025-06-16
Payer: COMMERCIAL

## 2025-06-16 VITALS
OXYGEN SATURATION: 98 % | DIASTOLIC BLOOD PRESSURE: 75 MMHG | BODY MASS INDEX: 19.97 KG/M2 | HEIGHT: 55 IN | SYSTOLIC BLOOD PRESSURE: 119 MMHG | WEIGHT: 86.3 LBS | HEART RATE: 89 BPM | TEMPERATURE: 97.9 F | RESPIRATION RATE: 20 BRPM

## 2025-06-16 DIAGNOSIS — R12 HEARTBURN: ICD-10-CM

## 2025-06-16 DIAGNOSIS — K86.89 PANCREATIC DUCT CALCULUS: ICD-10-CM

## 2025-06-16 DIAGNOSIS — H90.6 MIXED CONDUCTIVE AND SENSORINEURAL HEARING LOSS OF BOTH EARS: ICD-10-CM

## 2025-06-16 DIAGNOSIS — H69.91 ETD (EUSTACHIAN TUBE DYSFUNCTION), RIGHT: ICD-10-CM

## 2025-06-16 DIAGNOSIS — R03.0 ELEVATED BLOOD PRESSURE READING WITHOUT DIAGNOSIS OF HYPERTENSION: ICD-10-CM

## 2025-06-16 DIAGNOSIS — I51.7 CARDIOMEGALY: ICD-10-CM

## 2025-06-16 DIAGNOSIS — E55.9 VITAMIN D DEFICIENCY: ICD-10-CM

## 2025-06-16 DIAGNOSIS — K86.89 PANCREATIC INSUFFICIENCY: ICD-10-CM

## 2025-06-16 DIAGNOSIS — B65.9 SCHISTOSOMIASIS: ICD-10-CM

## 2025-06-16 DIAGNOSIS — M81.0 AGE-RELATED OSTEOPOROSIS WITHOUT CURRENT PATHOLOGICAL FRACTURE: ICD-10-CM

## 2025-06-16 DIAGNOSIS — J45.20 MILD INTERMITTENT ASTHMA WITHOUT COMPLICATION: Primary | ICD-10-CM

## 2025-06-16 DIAGNOSIS — Z02.9 ADMINISTRATIVE ENCOUNTER: ICD-10-CM

## 2025-06-16 LAB
ANION GAP SERPL CALCULATED.3IONS-SCNC: 12 MMOL/L (ref 7–15)
BUN SERPL-MCNC: 17 MG/DL (ref 8–23)
CALCIUM SERPL-MCNC: 9.6 MG/DL (ref 8.8–10.4)
CHLORIDE SERPL-SCNC: 104 MMOL/L (ref 98–107)
CREAT SERPL-MCNC: 0.73 MG/DL (ref 0.51–0.95)
EGFRCR SERPLBLD CKD-EPI 2021: 85 ML/MIN/1.73M2
GLUCOSE SERPL-MCNC: 86 MG/DL (ref 70–99)
HCO3 SERPL-SCNC: 25 MMOL/L (ref 22–29)
POTASSIUM SERPL-SCNC: 3.7 MMOL/L (ref 3.4–5.3)
SODIUM SERPL-SCNC: 141 MMOL/L (ref 135–145)
VIT D+METAB SERPL-MCNC: 35 NG/ML (ref 20–50)

## 2025-06-16 PROCEDURE — 99214 OFFICE O/P EST MOD 30 MIN: CPT | Performed by: FAMILY MEDICINE

## 2025-06-16 PROCEDURE — 36415 COLL VENOUS BLD VENIPUNCTURE: CPT | Performed by: FAMILY MEDICINE

## 2025-06-16 PROCEDURE — 3074F SYST BP LT 130 MM HG: CPT | Performed by: FAMILY MEDICINE

## 2025-06-16 PROCEDURE — 82306 VITAMIN D 25 HYDROXY: CPT | Performed by: FAMILY MEDICINE

## 2025-06-16 PROCEDURE — G2211 COMPLEX E/M VISIT ADD ON: HCPCS | Performed by: FAMILY MEDICINE

## 2025-06-16 PROCEDURE — 80048 BASIC METABOLIC PNL TOTAL CA: CPT | Performed by: FAMILY MEDICINE

## 2025-06-16 PROCEDURE — 3078F DIAST BP <80 MM HG: CPT | Performed by: FAMILY MEDICINE

## 2025-06-16 RX ORDER — PRAZIQUANTEL 600 MG/1
1200 TABLET, FILM COATED ORAL 2 TIMES DAILY
Qty: 4 TABLET | Refills: 0 | Status: SHIPPED | OUTPATIENT
Start: 2025-06-16 | End: 2025-06-17

## 2025-06-16 RX ORDER — ALBUTEROL SULFATE 90 UG/1
2 INHALANT RESPIRATORY (INHALATION) EVERY 4 HOURS PRN
Qty: 18 G | Refills: 3 | Status: SHIPPED | OUTPATIENT
Start: 2025-06-16

## 2025-06-16 RX ORDER — FAMOTIDINE 20 MG/1
20 TABLET, FILM COATED ORAL 2 TIMES DAILY PRN
Qty: 60 TABLET | Refills: 3 | Status: SHIPPED | OUTPATIENT
Start: 2025-06-16

## 2025-06-16 RX ORDER — ALENDRONATE SODIUM 70 MG/1
70 TABLET ORAL
Qty: 12 TABLET | Refills: 3 | Status: SHIPPED | OUTPATIENT
Start: 2025-06-16

## 2025-06-16 ASSESSMENT — ASTHMA QUESTIONNAIRES: ACT_TOTALSCORE: 25

## 2025-06-16 NOTE — PROGRESS NOTES
Assessment & Plan     Mild intermittent asthma without complication  Symptoms currently controlled - worse with cold weather. Continue albuterol prn, and could consider addition of a low dose ICS in winter months if symptoms worsen. Consider PFTs in fuure.  - albuterol (PROAIR HFA/PROVENTIL HFA/VENTOLIN HFA) 108 (90 Base) MCG/ACT inhaler; Inhale 2 puffs into the lungs every 4 hours as needed for shortness of breath, wheezing or cough.    Cardiomegaly  Declines scheduling TTE. Denies symptoms.    Age-related osteoporosis without current pathological fracture  Restart alendronate, counseled on michael to take weekly for at least 5 years. Plan repeat DEXA 2-3y from last.  - alendronate (FOSAMAX) 70 MG tablet; Take 1 tablet (70 mg) by mouth every 7 days. Take the medication on an empty stomach, first thing in the morning with at least 8 ounces of water--do not take with other drinks. Remain upright (do not lie down) and do not eat or take anything else by mouth (including other pills) until at least 30 minutes after taking the medication.  - Basic metabolic panel  (Ca, Cl, CO2, Creat, Gluc, K, Na, BUN); Future  - Basic metabolic panel  (Ca, Cl, CO2, Creat, Gluc, K, Na, BUN)    Pancreatic duct calculus  Pancreatic insufficiency  Denies symptoms, does not wish to schedule GI appointment or ERCP. Gaining weight now.    Elevated blood pressure reading without diagnosis of hypertension  Had one elevated BP but have since been normal. Not on any antihypertensives. Continue to monitor.    Heartburn  Occasionally gets burning with certain foods - trial famotidine prn.  - famotidine (PEPCID) 20 MG tablet; Take 1 tablet (20 mg) by mouth 2 times daily as needed (abdominal pain).    Vitamin D deficiency  Took vit D weekly for a few weeks then ran out. Provided counseling on pharmacy/refills. Check vit D.  - Vitamin D Deficiency; Future  - Vitamin D Deficiency    Schistosomiasis  Treat  - praziquantel (BILTRICIDE) 600 MG tablet; Take  2 tablets (1,200 mg) by mouth 2 times daily for 1 day.    Mixed conductive and sensorineural hearing loss of both ears  Has been fitted for hearing aids    ETD (Eustachian tube dysfunction), right  Has follow up with ENT      Administrative encounter  Filled out request for medical opinion: family member needing care paperwork today. Will have staff fax.          Follow up for AWV, asthma, ostoeporosis          Subjective   Marciano is a 75 year old, presenting for the following health issues:  Asthma, Blood Pressure Check, and Forms (Medical opinion form)      6/16/2025     8:48 AM   Additional Questions   Roomed by paw p   Accompanied by daughter         6/16/2025   Forms   Any forms needing to be completed Yes     History of Present Illness         Breathing depends on the weather - when warm, breathing is better but when cold, has coughing and difficulty breathing    Appetite has been good  Sometimes mention she has belly pain - spicy food, has not tried any medication    Took alendronate for a while but then ran out so stopped taking  Took vit D until ran out as well  Not taking any medication right now    Not interested in doing any further testing as she does not like to leave house to go to appointments             6/16/2025   Asthma   1.  In the past 4 weeks, how much of the time did your asthma keep you from getting as much done at work, school or at home? 5    2.  During the past 4 weeks, how often have you had shortness of breath? 5    3.  During the past 4 weeks, how often did your asthma symptoms (wheezing, coughing, shortness of breath, chest tightness or pain) wake you up at night or earlier than usual in the morning? 5    4.  During the past 4 weeks, how often have you used your rescue inhaler or nebulizer medication (such as albuterol)? 5    5.  How would you rate your asthma control during the past 4 weeks? 5    ACT TOTAL SCORE (Goal Greater than or Equal to 20) 25    In the past 12 months, how many  "times did you visit the emergency room for your asthma without being admitted to the hospital? 0    In the past 12 months, how many times were you hospitalized overnight because of your asthma? 0    Do you have a cough, wheezing or shortness of breath? None of these symptoms (Cough, Wheezing, Shortness of breath)   What makes your asthma/breathing worse? Cold air   Do you want more information about how to use your inhaler? No       Proxy-reported                   Review of Systems  Constitutional, HEENT, cardiovascular, pulmonary, gi and gu systems are negative, except as otherwise noted.      Objective    /75   Pulse 89   Temp 97.9  F (36.6  C) (Oral)   Resp 20   Ht 1.372 m (4' 6\")   Wt 39.1 kg (86 lb 4.8 oz)   SpO2 98%   BMI 20.81 kg/m    Body mass index is 20.81 kg/m .  Wt Readings from Last 3 Encounters:   06/16/25 39.1 kg (86 lb 4.8 oz)   02/11/25 38.2 kg (84 lb 4 oz)   10/21/24 35.4 kg (78 lb)     BP Readings from Last 3 Encounters:   06/16/25 119/75   03/04/25 128/62   02/11/25 (!) 144/80         Physical Exam   General: well-appearing, no acute distress  HEENT: normocephalic, atraumatic  Eyes: conjunctivae normal  Neck: normal ROM  Cardiovascular: regular rate and rhythm, normal S1 and S2, no murmurs/rubs/gallops  Pulmonary: no increased work of breathing, clear to auscultation bilaterally, no wheezes/rales/rhonchi  Musculoskeletal: normal ROM, no deformity  Neurological: gross motor exam normal  Skin: no rashes or lesions               Signed Electronically by: Maia Walton MD    "

## 2025-06-17 ENCOUNTER — TELEPHONE (OUTPATIENT)
Dept: FAMILY MEDICINE | Facility: CLINIC | Age: 75
End: 2025-06-17

## 2025-06-17 ENCOUNTER — RESULTS FOLLOW-UP (OUTPATIENT)
Dept: FAMILY MEDICINE | Facility: CLINIC | Age: 75
End: 2025-06-17
Payer: COMMERCIAL

## 2025-06-17 DIAGNOSIS — M81.0 AGE-RELATED OSTEOPOROSIS WITHOUT CURRENT PATHOLOGICAL FRACTURE: Primary | ICD-10-CM

## 2025-06-17 RX ORDER — GINGER ROOT/GINGER ROOT EXT 262.5 MG
1 CAPSULE ORAL 2 TIMES DAILY
Qty: 180 TABLET | Refills: 3 | Status: SHIPPED | OUTPATIENT
Start: 2025-06-17

## 2025-06-17 NOTE — RESULT ENCOUNTER NOTE
RN team - please call patient with results. Vitamin D levels are back to normal - I will send a calcium/vitamin D supplement that she should take twice a day to help strengthen the bones, in addition to the alendronate. Other labs were normal.

## 2025-06-17 NOTE — TELEPHONE ENCOUNTER
Called patient and spoke to her granddaughter (CTC on file). Relayed message and she verbalized understanding.      Mack Daley, BSN RN  [facility]

## 2025-06-17 NOTE — TELEPHONE ENCOUNTER
Attempt x1 to notify patient: Relay a message    Regarding: Request for Medical Opinion Form Completed   Dr. Walton completed form.   Faxed to Edwin Gibbs with [phone]     Copy sent for scanning.   (TC received the completed form from blue folder )

## 2025-06-26 ENCOUNTER — OFFICE VISIT (OUTPATIENT)
Dept: OTOLARYNGOLOGY | Facility: CLINIC | Age: 75
End: 2025-06-26
Payer: COMMERCIAL

## 2025-06-26 DIAGNOSIS — J32.4 CHRONIC PANSINUSITIS: ICD-10-CM

## 2025-06-26 DIAGNOSIS — H69.91 ETD (EUSTACHIAN TUBE DYSFUNCTION), RIGHT: Primary | ICD-10-CM

## 2025-06-26 DIAGNOSIS — H90.A31 MIXED CONDUCTIVE AND SENSORINEURAL HEARING LOSS OF RIGHT EAR WITH RESTRICTED HEARING OF LEFT EAR: ICD-10-CM

## 2025-06-26 RX ORDER — CALCIUM CARBONATE/VITAMIN D3 600 MG-20
1 TABLET ORAL
COMMUNITY
Start: 2025-06-17

## 2025-06-26 NOTE — LETTER
6/26/2025      [facility]  [address] [place] [address]      Dear Colleague,    Thank you for referring your patient, Marcianoabelino Story, to the [facility]. Please see a copy of my visit note below.    Assessment & Plan    ETD (Eustachian tube dysfunction), right, and Chronic pansinusitis:  -  Not entirely clear how much sinus disease is contributing to the dysfunction. Chronic sinus disease is otherwise completely asymptomatic Patient declined surgery and two week trial low dose prednisone treatment. Continue current nasal spray treatment. Follow-up in February.    Negative pressure on right decreased compared to Feb, but symptomatically remains the same.        Problem List Items Addressed This Visit          Ear/Nose/Throat    Chronic pansinusitis    ETD (Eustachian tube dysfunction), right - Primary    Relevant Orders    TYMPANOMETRY (Completed)    NASAL ENDOSCOPY, DIAGNOSTIC (Completed)    Adult Audiology  Referral    Mixed conductive and sensorineural hearing loss of right ear with restricted hearing of left ear    Relevant Orders    Adult Audiology  Referral         27 minutes spent on the date of the encounter doing chart review, history and exam, documentation and further activities per the note  {     HILDA Elias  [facility]    Subjective     HPI-    Last Visit w/ me Feb:  Ears were cleaned and was ultimately found to have a significant right-sided conductive hearing loss w/ negative pressure on right tymp.  Temporal bone CT showed extensive sinus disease.  Patient Denied any   Overt symptoms of this.  She started nasal saline rinses, nasal steroid spray.  She has subsequently been fitted with hearing aids.       Today, reports continued hearing loss on right, has been using sprays, no pain,  again denies facial pressure, rhinitis, PND and loss of smell.         Interpretor present throughout visit.        Review of Systems   ENT as  above      Objective    There were no vitals taken for this visit.    Physical Exam     Nasal Endoscopy -     The patient was counseled that their symptoms and history require a direct visualization with an endoscopy procedure.  They understood and we proceeded with a fiberoptic examination.  First I sprayed both sides of the nose with a mixture of lidocaine and oxymetazline.  I then passed the scope through the nasal cavity.  Color photographs were taken for the permanent medical record.  The interior of the nasal cavity and the middle and superior meatus, the turbinates, and the sphenoethmoid recess was unremarkable.  The nasopharynx was mucosally covered and symmetric. The Eustachian tube openings were unobstructed.    Patient tolerated well.      Right eustachian tube small amount of mucus versus nasal spray ordered       Right middle meatus        LEFT superior nasal passage possibly a small amount of drainage        LEFT middle meatus        Ears - The tympanic membranes are normal in appearance, bony landmarks are intact.  No retraction, perforation, or masses.   No fluid or purulence was seen in the external canal or the middle ear. No evidence of infection of the middle ear or external canal, cerumen was normal in appearance.    Right tymp          Again, thank you for allowing me to participate in the care of your patient.        Sincerely,        HILDA Elias    Electronically signed

## 2025-07-16 ENCOUNTER — PATIENT OUTREACH (OUTPATIENT)
Dept: GERIATRIC MEDICINE | Facility: CLINIC | Age: 75
End: 2025-07-16
Payer: COMMERCIAL

## 2025-07-16 NOTE — PROGRESS NOTES
[facility] Care Coordination Contact    Care [place] in [place] to find out that member's consultation service plan was completed on 6/18/25 and was not informed by [employer].  Care coordinator adjusted units and dates and put in auth request for the rest of the 6 months in the Care Plan for CFSS with provider, [facility].  Care coordinator emailed to [facility] that plan is completed and signed it 7/16/25.  Task CMS to enter in for CFSS and CFSS training.    RAMU Emmanuel  [facility]  [phone]

## 2025-07-21 ENCOUNTER — PATIENT OUTREACH (OUTPATIENT)
Dept: GERIATRIC MEDICINE | Facility: CLINIC | Age: 75
End: 2025-07-21
Payer: COMMERCIAL

## 2025-07-21 NOTE — PROGRESS NOTES
[facility] Care Coordination Contact    Mailed service delivery plan to member and [facility]. Sent copy of revised support plan to member. Submitted auth to health plan. Updated database.     Dorothy Diaz  [facility]  Senior Care Management Specialist  [phone]